# Patient Record
Sex: FEMALE | Race: OTHER | HISPANIC OR LATINO
[De-identification: names, ages, dates, MRNs, and addresses within clinical notes are randomized per-mention and may not be internally consistent; named-entity substitution may affect disease eponyms.]

---

## 2021-09-10 PROBLEM — Z00.00 ENCOUNTER FOR PREVENTIVE HEALTH EXAMINATION: Status: ACTIVE | Noted: 2021-09-10

## 2021-09-16 ENCOUNTER — APPOINTMENT (OUTPATIENT)
Dept: FAMILY MEDICINE | Facility: HOSPITAL | Age: 21
End: 2021-09-16

## 2021-09-16 ENCOUNTER — RESULT CHARGE (OUTPATIENT)
Age: 21
End: 2021-09-16

## 2021-09-16 ENCOUNTER — MED ADMIN CHARGE (OUTPATIENT)
Age: 21
End: 2021-09-16

## 2021-09-16 ENCOUNTER — OUTPATIENT (OUTPATIENT)
Dept: OUTPATIENT SERVICES | Facility: HOSPITAL | Age: 21
LOS: 1 days | End: 2021-09-16
Payer: SELF-PAY

## 2021-09-16 VITALS
SYSTOLIC BLOOD PRESSURE: 111 MMHG | DIASTOLIC BLOOD PRESSURE: 74 MMHG | OXYGEN SATURATION: 99 % | RESPIRATION RATE: 14 BRPM | WEIGHT: 102 LBS | HEART RATE: 62 BPM | TEMPERATURE: 97.1 F

## 2021-09-16 VITALS — BODY MASS INDEX: 18.07 KG/M2 | HEIGHT: 63 IN

## 2021-09-16 DIAGNOSIS — Z00.00 ENCOUNTER FOR GENERAL ADULT MEDICAL EXAMINATION WITHOUT ABNORMAL FINDINGS: ICD-10-CM

## 2021-09-16 DIAGNOSIS — R10.2 PELVIC AND PERINEAL PAIN: ICD-10-CM

## 2021-09-16 DIAGNOSIS — Z23 ENCOUNTER FOR IMMUNIZATION: ICD-10-CM

## 2021-09-16 DIAGNOSIS — Z11.3 ENCOUNTER FOR SCREENING FOR INFECTIONS WITH A PREDOMINANTLY SEXUAL MODE OF TRANSMISSION: ICD-10-CM

## 2021-09-16 LAB
BILIRUB UR QL STRIP: NORMAL
CLARITY UR: CLEAR
COLLECTION METHOD: NORMAL
GLUCOSE UR-MCNC: NORMAL
HCG UR QL: 0.2 EU/DL
HGB UR QL STRIP.AUTO: NORMAL
KETONES UR-MCNC: NORMAL
LEUKOCYTE ESTERASE UR QL STRIP: NORMAL
NITRITE UR QL STRIP: NORMAL
PH UR STRIP: 6
PROT UR STRIP-MCNC: NORMAL
SP GR UR STRIP: 1.02

## 2021-09-16 PROCEDURE — G0463: CPT

## 2021-09-16 PROCEDURE — 87800 DETECT AGNT MULT DNA DIREC: CPT

## 2021-09-16 PROCEDURE — 36415 COLL VENOUS BLD VENIPUNCTURE: CPT

## 2021-09-16 PROCEDURE — 87086 URINE CULTURE/COLONY COUNT: CPT

## 2021-09-16 PROCEDURE — 86780 TREPONEMA PALLIDUM: CPT

## 2021-09-16 PROCEDURE — 87389 HIV-1 AG W/HIV-1&-2 AB AG IA: CPT

## 2021-09-16 PROCEDURE — 81002 URINALYSIS NONAUTO W/O SCOPE: CPT

## 2021-09-17 LAB
C TRACH RRNA SPEC QL NAA+PROBE: NOT DETECTED
HIV1+2 AB SPEC QL IA.RAPID: NONREACTIVE
N GONORRHOEA RRNA SPEC QL NAA+PROBE: NOT DETECTED
SOURCE AMPLIFICATION: NORMAL
T PALLIDUM AB SER QL IA: NEGATIVE

## 2021-09-18 DIAGNOSIS — N76.0 ACUTE VAGINITIS: ICD-10-CM

## 2021-09-18 DIAGNOSIS — B96.89 ACUTE VAGINITIS: ICD-10-CM

## 2021-09-18 RX ORDER — METRONIDAZOLE 500 MG/1
500 TABLET ORAL TWICE DAILY
Qty: 14 | Refills: 0 | Status: ACTIVE | COMMUNITY
Start: 2021-09-18 | End: 1900-01-01

## 2021-09-19 DIAGNOSIS — R10.2 PELVIC AND PERINEAL PAIN: ICD-10-CM

## 2021-09-19 DIAGNOSIS — Z11.3 ENCOUNTER FOR SCREENING FOR INFECTIONS WITH A PREDOMINANTLY SEXUAL MODE OF TRANSMISSION: ICD-10-CM

## 2021-09-19 LAB
BACTERIA UR CULT: NORMAL
CANDIDA VAG CYTO: NOT DETECTED
G VAGINALIS+PREV SP MTYP VAG QL MICRO: DETECTED
T VAGINALIS VAG QL WET PREP: NOT DETECTED

## 2021-09-19 NOTE — REVIEW OF SYSTEMS
[Abdominal Pain] : abdominal pain [Dysuria] : dysuria [Vaginal Discharge] : vaginal discharge [Back Pain] : back pain [Negative] : Heme/Lymph [Joint Pain] : no joint pain [Joint Stiffness] : no joint stiffness [Joint Swelling] : no joint swelling [Muscle Weakness] : no muscle weakness [Muscle Pain] : no muscle pain

## 2021-09-19 NOTE — HISTORY OF PRESENT ILLNESS
[FreeTextEntry8] : 21 Y old female no PMH here for episodic suprapubic and lower back pain for past 3 months. The pain is non radiating and is intermittent throughout the day. Patient endorses dysuria and white vaginal discharge but denies increased urinary frequency or urgency. Patient denies fevers, chills, SOB, and chest pain. Patient is currently not sexually active and has 2 Nexplanon implants in left arm that was placed in home country of [place]. LMP was Jan 2020. Patient is not on any medications currently and recently [place] the [place] from [place]. \par

## 2021-09-19 NOTE — ASSESSMENT
[FreeTextEntry1] : # Suprapubic pain/ STD screening\par -Pain is non radiating associated with dysuria and white vaginal discharge\par - no fevers, vital signs stable.  \par - POCT urinalysis is negative, culture sent  \par - BV and GC ordered  \par - STI screening, syphilis, HIV ordered  \par \par #Nexplanon\par - patient has 2 nexplanons on left arm. patient states that is how it is done in [facility]. \par -Possible that 1st could not be removed so second one was placed without removal \par - Patient can return to clinic when Dr. Gerard ( OB-GYN) is available for possible removal. \par \par RTC in 1-2 weeks for CPE / nexplanon removal\par \par d/w Dr. Mcguire\par

## 2021-09-19 NOTE — PHYSICAL EXAM
[No Acute Distress] : no acute distress [Well Nourished] : well nourished [Well Developed] : well developed [Well-Appearing] : well-appearing [Normal Sclera/Conjunctiva] : normal sclera/conjunctiva [PERRL] : pupils equal round and reactive to light [EOMI] : extraocular movements intact [Normal Outer Ear/Nose] : the outer ears and nose were normal in appearance [Normal Oropharynx] : the oropharynx was normal [No JVD] : no jugular venous distention [No Lymphadenopathy] : no lymphadenopathy [Supple] : supple [Thyroid Normal, No Nodules] : the thyroid was normal and there were no nodules present [No Respiratory Distress] : no respiratory distress  [No Accessory Muscle Use] : no accessory muscle use [Clear to Auscultation] : lungs were clear to auscultation bilaterally [Normal Rate] : normal rate  [Regular Rhythm] : with a regular rhythm [Normal S1, S2] : normal S1 and S2 [No Murmur] : no murmur heard [No Carotid Bruits] : no carotid bruits [No Abdominal Bruit] : a ~M bruit was not heard ~T in the abdomen [No Varicosities] : no varicosities [Pedal Pulses Present] : the pedal pulses are present [No Edema] : there was no peripheral edema [No Palpable Aorta] : no palpable aorta [No Extremity Clubbing/Cyanosis] : no extremity clubbing/cyanosis [Soft] : abdomen soft [Non-distended] : non-distended [No Masses] : no abdominal mass palpated [No HSM] : no HSM [Normal Bowel Sounds] : normal bowel sounds [Normal Posterior Cervical Nodes] : no posterior cervical lymphadenopathy [Normal Anterior Cervical Nodes] : no anterior cervical lymphadenopathy [No CVA Tenderness] : no CVA  tenderness [No Spinal Tenderness] : no spinal tenderness [No Joint Swelling] : no joint swelling [Grossly Normal Strength/Tone] : grossly normal strength/tone [No Rash] : no rash [Coordination Grossly Intact] : coordination grossly intact [No Focal Deficits] : no focal deficits [Normal Gait] : normal gait [Deep Tendon Reflexes (DTR)] : deep tendon reflexes were 2+ and symmetric [Normal Affect] : the affect was normal [Normal Insight/Judgement] : insight and judgment were intact [de-identified] : Suprapubic tenderness

## 2021-10-01 ENCOUNTER — APPOINTMENT (OUTPATIENT)
Dept: FAMILY MEDICINE | Facility: HOSPITAL | Age: 21
End: 2021-10-01

## 2022-08-28 ENCOUNTER — INPATIENT (INPATIENT)
Facility: HOSPITAL | Age: 22
LOS: 4 days | Discharge: HOME | End: 2022-09-02
Attending: HOSPITALIST | Admitting: HOSPITALIST

## 2022-08-28 VITALS
TEMPERATURE: 102 F | HEART RATE: 111 BPM | RESPIRATION RATE: 18 BRPM | DIASTOLIC BLOOD PRESSURE: 56 MMHG | SYSTOLIC BLOOD PRESSURE: 107 MMHG | OXYGEN SATURATION: 99 %

## 2022-08-28 LAB
ALBUMIN SERPL ELPH-MCNC: 4.2 G/DL — SIGNIFICANT CHANGE UP (ref 3.3–5)
ALP SERPL-CCNC: 130 U/L — HIGH (ref 40–120)
ALT FLD-CCNC: 92 U/L — HIGH (ref 4–33)
ANION GAP SERPL CALC-SCNC: 13 MMOL/L — SIGNIFICANT CHANGE UP (ref 7–14)
APPEARANCE UR: ABNORMAL
APTT BLD: 30.6 SEC — SIGNIFICANT CHANGE UP (ref 27–36.3)
AST SERPL-CCNC: 40 U/L — HIGH (ref 4–32)
B PERT DNA SPEC QL NAA+PROBE: SIGNIFICANT CHANGE UP
B PERT+PARAPERT DNA PNL SPEC NAA+PROBE: SIGNIFICANT CHANGE UP
BACTERIA # UR AUTO: ABNORMAL
BASE EXCESS BLDV CALC-SCNC: 1.3 MMOL/L — SIGNIFICANT CHANGE UP (ref -2–3)
BASOPHILS # BLD AUTO: 0 K/UL — SIGNIFICANT CHANGE UP (ref 0–0.2)
BASOPHILS NFR BLD AUTO: 0 % — SIGNIFICANT CHANGE UP (ref 0–2)
BILIRUB SERPL-MCNC: 1.1 MG/DL — SIGNIFICANT CHANGE UP (ref 0.2–1.2)
BILIRUB UR-MCNC: NEGATIVE — SIGNIFICANT CHANGE UP
BLOOD GAS VENOUS COMPREHENSIVE RESULT: SIGNIFICANT CHANGE UP
BORDETELLA PARAPERTUSSIS (RAPRVP): SIGNIFICANT CHANGE UP
BUN SERPL-MCNC: 12 MG/DL — SIGNIFICANT CHANGE UP (ref 7–23)
C PNEUM DNA SPEC QL NAA+PROBE: SIGNIFICANT CHANGE UP
CALCIUM SERPL-MCNC: 9.5 MG/DL — SIGNIFICANT CHANGE UP (ref 8.4–10.5)
CHLORIDE BLDV-SCNC: 100 MMOL/L — SIGNIFICANT CHANGE UP (ref 96–108)
CHLORIDE SERPL-SCNC: 101 MMOL/L — SIGNIFICANT CHANGE UP (ref 98–107)
CO2 BLDV-SCNC: 28 MMOL/L — HIGH (ref 22–26)
CO2 SERPL-SCNC: 24 MMOL/L — SIGNIFICANT CHANGE UP (ref 22–31)
COLOR SPEC: YELLOW — SIGNIFICANT CHANGE UP
CREAT SERPL-MCNC: 0.71 MG/DL — SIGNIFICANT CHANGE UP (ref 0.5–1.3)
D DIMER BLD IA.RAPID-MCNC: 683 NG/ML DDU — HIGH
DIFF PNL FLD: ABNORMAL
EGFR: 124 ML/MIN/1.73M2 — SIGNIFICANT CHANGE UP
EOSINOPHIL # BLD AUTO: 0 K/UL — SIGNIFICANT CHANGE UP (ref 0–0.5)
EOSINOPHIL NFR BLD AUTO: 0 % — SIGNIFICANT CHANGE UP (ref 0–6)
EPI CELLS # UR: 4 /HPF — SIGNIFICANT CHANGE UP (ref 0–5)
FLUAV SUBTYP SPEC NAA+PROBE: SIGNIFICANT CHANGE UP
FLUBV RNA SPEC QL NAA+PROBE: SIGNIFICANT CHANGE UP
GAS PNL BLDV: 134 MMOL/L — LOW (ref 136–145)
GIANT PLATELETS BLD QL SMEAR: PRESENT — SIGNIFICANT CHANGE UP
GLUCOSE BLDV-MCNC: 128 MG/DL — HIGH (ref 70–99)
GLUCOSE SERPL-MCNC: 129 MG/DL — HIGH (ref 70–99)
GLUCOSE UR QL: NEGATIVE — SIGNIFICANT CHANGE UP
HADV DNA SPEC QL NAA+PROBE: SIGNIFICANT CHANGE UP
HCO3 BLDV-SCNC: 27 MMOL/L — SIGNIFICANT CHANGE UP (ref 22–29)
HCOV 229E RNA SPEC QL NAA+PROBE: SIGNIFICANT CHANGE UP
HCOV HKU1 RNA SPEC QL NAA+PROBE: SIGNIFICANT CHANGE UP
HCOV NL63 RNA SPEC QL NAA+PROBE: SIGNIFICANT CHANGE UP
HCOV OC43 RNA SPEC QL NAA+PROBE: SIGNIFICANT CHANGE UP
HCT VFR BLD CALC: 34.4 % — LOW (ref 34.5–45)
HCT VFR BLDA CALC: 35 % — SIGNIFICANT CHANGE UP (ref 34.5–46.5)
HGB BLD CALC-MCNC: 11.5 G/DL — SIGNIFICANT CHANGE UP (ref 11.5–15.5)
HGB BLD-MCNC: 11.3 G/DL — LOW (ref 11.5–15.5)
HMPV RNA SPEC QL NAA+PROBE: SIGNIFICANT CHANGE UP
HPIV1 RNA SPEC QL NAA+PROBE: SIGNIFICANT CHANGE UP
HPIV2 RNA SPEC QL NAA+PROBE: SIGNIFICANT CHANGE UP
HPIV3 RNA SPEC QL NAA+PROBE: SIGNIFICANT CHANGE UP
HPIV4 RNA SPEC QL NAA+PROBE: SIGNIFICANT CHANGE UP
HYALINE CASTS # UR AUTO: 0 /LPF — SIGNIFICANT CHANGE UP (ref 0–7)
IANC: 9.99 K/UL — HIGH (ref 1.8–7.4)
INR BLD: 1.44 RATIO — HIGH (ref 0.88–1.16)
KETONES UR-MCNC: ABNORMAL
LACTATE BLDV-MCNC: 1.5 MMOL/L — SIGNIFICANT CHANGE UP (ref 0.5–2)
LEUKOCYTE ESTERASE UR-ACNC: ABNORMAL
LIDOCAIN IGE QN: 41 U/L — SIGNIFICANT CHANGE UP (ref 7–60)
LYMPHOCYTES # BLD AUTO: 0.43 K/UL — LOW (ref 1–3.3)
LYMPHOCYTES # BLD AUTO: 3.5 % — LOW (ref 13–44)
M PNEUMO DNA SPEC QL NAA+PROBE: SIGNIFICANT CHANGE UP
MCHC RBC-ENTMCNC: 27.8 PG — SIGNIFICANT CHANGE UP (ref 27–34)
MCHC RBC-ENTMCNC: 32.8 GM/DL — SIGNIFICANT CHANGE UP (ref 32–36)
MCV RBC AUTO: 84.5 FL — SIGNIFICANT CHANGE UP (ref 80–100)
MONOCYTES # BLD AUTO: 0.87 K/UL — SIGNIFICANT CHANGE UP (ref 0–0.9)
MONOCYTES NFR BLD AUTO: 7.1 % — SIGNIFICANT CHANGE UP (ref 2–14)
NEUTROPHILS # BLD AUTO: 10.93 K/UL — HIGH (ref 1.8–7.4)
NEUTROPHILS NFR BLD AUTO: 77.9 % — HIGH (ref 43–77)
NEUTS BAND # BLD: 11.5 % — CRITICAL HIGH (ref 0–6)
NITRITE UR-MCNC: POSITIVE
PCO2 BLDV: 44 MMHG — HIGH (ref 39–42)
PH BLDV: 7.39 — SIGNIFICANT CHANGE UP (ref 7.32–7.43)
PH UR: 6.5 — SIGNIFICANT CHANGE UP (ref 5–8)
PLAT MORPH BLD: NORMAL — SIGNIFICANT CHANGE UP
PLATELET # BLD AUTO: 160 K/UL — SIGNIFICANT CHANGE UP (ref 150–400)
PLATELET COUNT - ESTIMATE: NORMAL — SIGNIFICANT CHANGE UP
PO2 BLDV: 25 MMHG — SIGNIFICANT CHANGE UP
POTASSIUM BLDV-SCNC: 3.8 MMOL/L — SIGNIFICANT CHANGE UP (ref 3.5–5.1)
POTASSIUM SERPL-MCNC: 4 MMOL/L — SIGNIFICANT CHANGE UP (ref 3.5–5.3)
POTASSIUM SERPL-SCNC: 4 MMOL/L — SIGNIFICANT CHANGE UP (ref 3.5–5.3)
PROT SERPL-MCNC: 8.1 G/DL — SIGNIFICANT CHANGE UP (ref 6–8.3)
PROT UR-MCNC: ABNORMAL
PROTHROM AB SERPL-ACNC: 16.8 SEC — HIGH (ref 10.5–13.4)
RAPID RVP RESULT: DETECTED
RBC # BLD: 4.07 M/UL — SIGNIFICANT CHANGE UP (ref 3.8–5.2)
RBC # FLD: 13.5 % — SIGNIFICANT CHANGE UP (ref 10.3–14.5)
RBC BLD AUTO: NORMAL — SIGNIFICANT CHANGE UP
RBC CASTS # UR COMP ASSIST: 3 /HPF — SIGNIFICANT CHANGE UP (ref 0–4)
RSV RNA SPEC QL NAA+PROBE: SIGNIFICANT CHANGE UP
RV+EV RNA SPEC QL NAA+PROBE: DETECTED
SAO2 % BLDV: 40.2 % — SIGNIFICANT CHANGE UP
SARS-COV-2 RNA SPEC QL NAA+PROBE: SIGNIFICANT CHANGE UP
SODIUM SERPL-SCNC: 138 MMOL/L — SIGNIFICANT CHANGE UP (ref 135–145)
SP GR SPEC: 1.02 — SIGNIFICANT CHANGE UP (ref 1.01–1.05)
UROBILINOGEN FLD QL: SIGNIFICANT CHANGE UP
WBC # BLD: 12.23 K/UL — HIGH (ref 3.8–10.5)
WBC # FLD AUTO: 12.23 K/UL — HIGH (ref 3.8–10.5)
WBC UR QL: 344 /HPF — HIGH (ref 0–5)

## 2022-08-28 PROCEDURE — 71045 X-RAY EXAM CHEST 1 VIEW: CPT | Mod: 26

## 2022-08-28 PROCEDURE — 99285 EMERGENCY DEPT VISIT HI MDM: CPT

## 2022-08-28 RX ORDER — ACETAMINOPHEN 500 MG
650 TABLET ORAL ONCE
Refills: 0 | Status: COMPLETED | OUTPATIENT
Start: 2022-08-28 | End: 2022-08-28

## 2022-08-28 RX ORDER — SODIUM CHLORIDE 9 MG/ML
2000 INJECTION INTRAMUSCULAR; INTRAVENOUS; SUBCUTANEOUS ONCE
Refills: 0 | Status: COMPLETED | OUTPATIENT
Start: 2022-08-28 | End: 2022-08-28

## 2022-08-28 RX ORDER — SODIUM CHLORIDE 9 MG/ML
1000 INJECTION INTRAMUSCULAR; INTRAVENOUS; SUBCUTANEOUS ONCE
Refills: 0 | Status: COMPLETED | OUTPATIENT
Start: 2022-08-28 | End: 2022-08-28

## 2022-08-28 RX ORDER — KETOROLAC TROMETHAMINE 30 MG/ML
15 SYRINGE (ML) INJECTION ONCE
Refills: 0 | Status: DISCONTINUED | OUTPATIENT
Start: 2022-08-28 | End: 2022-08-28

## 2022-08-28 RX ORDER — CEFTRIAXONE 500 MG/1
1000 INJECTION, POWDER, FOR SOLUTION INTRAMUSCULAR; INTRAVENOUS ONCE
Refills: 0 | Status: COMPLETED | OUTPATIENT
Start: 2022-08-28 | End: 2022-08-28

## 2022-08-28 RX ORDER — ONDANSETRON 8 MG/1
4 TABLET, FILM COATED ORAL ONCE
Refills: 0 | Status: COMPLETED | OUTPATIENT
Start: 2022-08-28 | End: 2022-08-28

## 2022-08-28 RX ADMIN — Medication 15 MILLIGRAM(S): at 21:49

## 2022-08-28 RX ADMIN — Medication 650 MILLIGRAM(S): at 21:55

## 2022-08-28 RX ADMIN — Medication 650 MILLIGRAM(S): at 19:08

## 2022-08-28 RX ADMIN — SODIUM CHLORIDE 1000 MILLILITER(S): 9 INJECTION INTRAMUSCULAR; INTRAVENOUS; SUBCUTANEOUS at 21:49

## 2022-08-28 RX ADMIN — SODIUM CHLORIDE 2000 MILLILITER(S): 9 INJECTION INTRAMUSCULAR; INTRAVENOUS; SUBCUTANEOUS at 19:05

## 2022-08-28 RX ADMIN — CEFTRIAXONE 100 MILLIGRAM(S): 500 INJECTION, POWDER, FOR SOLUTION INTRAMUSCULAR; INTRAVENOUS at 21:48

## 2022-08-28 RX ADMIN — ONDANSETRON 4 MILLIGRAM(S): 8 TABLET, FILM COATED ORAL at 19:08

## 2022-08-28 NOTE — ED PROVIDER NOTE - PROGRESS NOTE DETAILS
ordered ct angio chest for elevated dimer. pt aware +UTI, order IV abx. SBP 93, pt not dizzy or lightheaded. order 3rd IVF, pending cta pt stable, sleeping. ct cw pyelonephritis no pe. will admit. no cdu beds SBP 93, pt not dizzy or lightheaded. order 3rd IVF, pending cta  aware will need admission Admit to Dr Rogers   temp 103 will give IVF and tylenol

## 2022-08-28 NOTE — ED PROVIDER NOTE - OBJECTIVE STATEMENT
Dr Flores  22yoF denies any sig pmhx pw fever, abdominal pain, nausea and vomiting x 3 days. Endorse temp 101.3 w chills at home, +diffuse body ache, decrease po intake, associated with nausea, NB vomit, lose nonbloody BM. no travel no sick contact no cough. no chest pain or sob, states "when deep breath it hurts" on both sides of upper abdomen. no ROCHE . no hx of PE. Vaccinated for covid. Denies any rash Denies urinary complaints, Denies preg  DW pt in Zimbabwean and english Dr Flores  22yoF denies any sig pmhx pw fever, abdominal pain, nausea and vomiting x 3 days. Endorse temp 101.3 w chills at home, +diffuse body ache, decrease po intake, associated with nausea, NB vomit, lose nonbloody BM. no travel no sick contact no cough. no chest pain or sob, states "when deep breath it hurts" on both sides of upper abdomen. no ROCHE . no hx of PE. Vaccinated for covid. Denies any rash Denies urinary complaints, Denies preg  DW pt in Emir[place] and english Dr Flores  22yoF denies any sig pmhx pw fever, abdominal pain, nausea and vomiting x 3 days. Endorse temp 101.3 w chills at home, +diffuse body ache, decrease po intake, associated with nausea, NB vomit, lose nonbloody BM. no travel no sick contact no cough. no chest pain or sob, states "when deep breath it hurts" on both sides of upper abdomen. no ROCHE . no hx of PE. Vaccinated for covid. Denies any rash Denies urinary complaints, Denies preg  DW pt in Italian and english

## 2022-08-28 NOTE — ED PROVIDER NOTE - PHYSICAL EXAMINATION
Dr Flores  nontoxic female, no photophobia supple neck. non icteric no juandice   regular tachycardic  No resp distress. able to speak in full and clear sentences. no wheeze, rales or stridor.  thin female nondistended mild tenderness of the upper abdomen lateral aspect bl, no rash, no ecchymosis no cvat   nontender lower abdomen  no pedal edema. no calf tenderness. normal pulses bilateral feet.

## 2022-08-28 NOTE — ED ADULT NURSE REASSESSMENT NOTE - NS ED NURSE REASSESS COMMENT FT1
pt alert,oriented x3. reports " feels a little better" meds as ordered given. md aware v/s. family member at bedside.

## 2022-08-28 NOTE — ED PROVIDER NOTE - MDM ORDERS SUBMITTED SELECTION
Labs/Imaging Studies/Medications Full Thickness Lip Wedge Repair (Flap) Text: Given the location of the defect and the proximity to free margins a full thickness wedge repair was deemed most appropriate.  Using a sterile surgical marker, the appropriate repair was drawn incorporating the defect and placing the expected incisions perpendicular to the vermilion border.  The vermilion border was also meticulously outlined to ensure appropriate reapproximation during the repair.  The area thus outlined was incised through and through with a #15 scalpel blade.  The muscularis and dermis were reaproximated with deep sutures following hemostasis. Care was taken to realign the vermilion border before proceeding with the superficial closure.  Once the vermilion was realigned the superfical and mucosal closure was finished.

## 2022-08-28 NOTE — ED ADULT NURSE NOTE - OBJECTIVE STATEMENT
c/o fever, SOB and midsternal c/p onset 2 days ago worse today, abd soft non distended non tender, breathing is even and unlabored, dry non productive cough noted, lung sounds clear B/l orders noted and completed.

## 2022-08-29 ENCOUNTER — TRANSCRIPTION ENCOUNTER (OUTPATIENT)
Age: 22
End: 2022-08-29

## 2022-08-29 ENCOUNTER — NON-APPOINTMENT (OUTPATIENT)
Age: 22
End: 2022-08-29

## 2022-08-29 DIAGNOSIS — R79.89 OTHER SPECIFIED ABNORMAL FINDINGS OF BLOOD CHEMISTRY: ICD-10-CM

## 2022-08-29 DIAGNOSIS — N12 TUBULO-INTERSTITIAL NEPHRITIS, NOT SPECIFIED AS ACUTE OR CHRONIC: ICD-10-CM

## 2022-08-29 DIAGNOSIS — Z29.9 ENCOUNTER FOR PROPHYLACTIC MEASURES, UNSPECIFIED: ICD-10-CM

## 2022-08-29 DIAGNOSIS — B34.1 ENTEROVIRUS INFECTION, UNSPECIFIED: ICD-10-CM

## 2022-08-29 DIAGNOSIS — A41.9 SEPSIS, UNSPECIFIED ORGANISM: ICD-10-CM

## 2022-08-29 DIAGNOSIS — R16.1 SPLENOMEGALY, NOT ELSEWHERE CLASSIFIED: ICD-10-CM

## 2022-08-29 LAB
ALBUMIN SERPL ELPH-MCNC: 3.3 G/DL — SIGNIFICANT CHANGE UP (ref 3.3–5)
ALP SERPL-CCNC: 124 U/L — HIGH (ref 40–120)
ALT FLD-CCNC: 128 U/L — HIGH (ref 4–33)
ANION GAP SERPL CALC-SCNC: 12 MMOL/L — SIGNIFICANT CHANGE UP (ref 7–14)
AST SERPL-CCNC: 82 U/L — HIGH (ref 4–32)
BASOPHILS # BLD AUTO: 0.01 K/UL — SIGNIFICANT CHANGE UP (ref 0–0.2)
BASOPHILS NFR BLD AUTO: 0.1 % — SIGNIFICANT CHANGE UP (ref 0–2)
BILIRUB SERPL-MCNC: 1 MG/DL — SIGNIFICANT CHANGE UP (ref 0.2–1.2)
BUN SERPL-MCNC: 9 MG/DL — SIGNIFICANT CHANGE UP (ref 7–23)
CALCIUM SERPL-MCNC: 8.1 MG/DL — LOW (ref 8.4–10.5)
CHLORIDE SERPL-SCNC: 107 MMOL/L — SIGNIFICANT CHANGE UP (ref 98–107)
CO2 SERPL-SCNC: 18 MMOL/L — LOW (ref 22–31)
CREAT SERPL-MCNC: 0.77 MG/DL — SIGNIFICANT CHANGE UP (ref 0.5–1.3)
EGFR: 112 ML/MIN/1.73M2 — SIGNIFICANT CHANGE UP
EOSINOPHIL # BLD AUTO: 0 K/UL — SIGNIFICANT CHANGE UP (ref 0–0.5)
EOSINOPHIL NFR BLD AUTO: 0 % — SIGNIFICANT CHANGE UP (ref 0–6)
GLUCOSE BLDC GLUCOMTR-MCNC: 93 MG/DL — SIGNIFICANT CHANGE UP (ref 70–99)
GLUCOSE SERPL-MCNC: 115 MG/DL — HIGH (ref 70–99)
HAV IGM SER-ACNC: SIGNIFICANT CHANGE UP
HBV CORE IGM SER-ACNC: SIGNIFICANT CHANGE UP
HBV SURFACE AG SER-ACNC: SIGNIFICANT CHANGE UP
HCT VFR BLD CALC: 31.7 % — LOW (ref 34.5–45)
HCV AB S/CO SERPL IA: 0.12 S/CO — SIGNIFICANT CHANGE UP (ref 0–0.99)
HCV AB SERPL-IMP: SIGNIFICANT CHANGE UP
HGB BLD-MCNC: 10.5 G/DL — LOW (ref 11.5–15.5)
HIV 1+2 AB+HIV1 P24 AG SERPL QL IA: SIGNIFICANT CHANGE UP
IANC: 7.54 K/UL — HIGH (ref 1.8–7.4)
IMM GRANULOCYTES NFR BLD AUTO: 1 % — SIGNIFICANT CHANGE UP (ref 0–1.5)
LYMPHOCYTES # BLD AUTO: 1.01 K/UL — SIGNIFICANT CHANGE UP (ref 1–3.3)
LYMPHOCYTES # BLD AUTO: 10.8 % — LOW (ref 13–44)
MAGNESIUM SERPL-MCNC: 1.9 MG/DL — SIGNIFICANT CHANGE UP (ref 1.6–2.6)
MCHC RBC-ENTMCNC: 28.8 PG — SIGNIFICANT CHANGE UP (ref 27–34)
MCHC RBC-ENTMCNC: 33.1 GM/DL — SIGNIFICANT CHANGE UP (ref 32–36)
MCV RBC AUTO: 86.8 FL — SIGNIFICANT CHANGE UP (ref 80–100)
MONOCYTES # BLD AUTO: 0.66 K/UL — SIGNIFICANT CHANGE UP (ref 0–0.9)
MONOCYTES NFR BLD AUTO: 7.1 % — SIGNIFICANT CHANGE UP (ref 2–14)
NEUTROPHILS # BLD AUTO: 7.54 K/UL — HIGH (ref 1.8–7.4)
NEUTROPHILS NFR BLD AUTO: 81 % — HIGH (ref 43–77)
NRBC # BLD: 0 /100 WBCS — SIGNIFICANT CHANGE UP (ref 0–0)
NRBC # FLD: 0 K/UL — SIGNIFICANT CHANGE UP (ref 0–0)
PHOSPHATE SERPL-MCNC: 2.4 MG/DL — LOW (ref 2.5–4.5)
PLATELET # BLD AUTO: 129 K/UL — LOW (ref 150–400)
POTASSIUM SERPL-MCNC: 3.7 MMOL/L — SIGNIFICANT CHANGE UP (ref 3.5–5.3)
POTASSIUM SERPL-SCNC: 3.7 MMOL/L — SIGNIFICANT CHANGE UP (ref 3.5–5.3)
PROT SERPL-MCNC: 6.7 G/DL — SIGNIFICANT CHANGE UP (ref 6–8.3)
RBC # BLD: 3.65 M/UL — LOW (ref 3.8–5.2)
RBC # FLD: 13.9 % — SIGNIFICANT CHANGE UP (ref 10.3–14.5)
SODIUM SERPL-SCNC: 137 MMOL/L — SIGNIFICANT CHANGE UP (ref 135–145)
WBC # BLD: 9.31 K/UL — SIGNIFICANT CHANGE UP (ref 3.8–10.5)
WBC # FLD AUTO: 9.31 K/UL — SIGNIFICANT CHANGE UP (ref 3.8–10.5)

## 2022-08-29 PROCEDURE — G1004: CPT

## 2022-08-29 PROCEDURE — 12345: CPT | Mod: NC

## 2022-08-29 PROCEDURE — 74177 CT ABD & PELVIS W/CONTRAST: CPT | Mod: 26,MG

## 2022-08-29 PROCEDURE — 93010 ELECTROCARDIOGRAM REPORT: CPT

## 2022-08-29 PROCEDURE — 71275 CT ANGIOGRAPHY CHEST: CPT | Mod: 26,MG

## 2022-08-29 PROCEDURE — 99223 1ST HOSP IP/OBS HIGH 75: CPT

## 2022-08-29 RX ORDER — PIPERACILLIN AND TAZOBACTAM 4; .5 G/20ML; G/20ML
3.38 INJECTION, POWDER, LYOPHILIZED, FOR SOLUTION INTRAVENOUS ONCE
Refills: 0 | Status: COMPLETED | OUTPATIENT
Start: 2022-08-29 | End: 2022-08-29

## 2022-08-29 RX ORDER — OXYCODONE AND ACETAMINOPHEN 5; 325 MG/1; MG/1
1 TABLET ORAL EVERY 8 HOURS
Refills: 0 | Status: DISCONTINUED | OUTPATIENT
Start: 2022-08-29 | End: 2022-09-01

## 2022-08-29 RX ORDER — SODIUM CHLORIDE 9 MG/ML
1000 INJECTION INTRAMUSCULAR; INTRAVENOUS; SUBCUTANEOUS
Refills: 0 | Status: DISCONTINUED | OUTPATIENT
Start: 2022-08-29 | End: 2022-09-02

## 2022-08-29 RX ORDER — IBUPROFEN 200 MG
400 TABLET ORAL ONCE
Refills: 0 | Status: COMPLETED | OUTPATIENT
Start: 2022-08-29 | End: 2022-08-29

## 2022-08-29 RX ORDER — SODIUM CHLORIDE 9 MG/ML
3 INJECTION INTRAMUSCULAR; INTRAVENOUS; SUBCUTANEOUS EVERY 8 HOURS
Refills: 0 | Status: DISCONTINUED | OUTPATIENT
Start: 2022-08-29 | End: 2022-09-02

## 2022-08-29 RX ORDER — ACETAMINOPHEN 500 MG
650 TABLET ORAL EVERY 6 HOURS
Refills: 0 | Status: DISCONTINUED | OUTPATIENT
Start: 2022-08-29 | End: 2022-09-02

## 2022-08-29 RX ORDER — MEROPENEM 1 G/30ML
1000 INJECTION INTRAVENOUS EVERY 8 HOURS
Refills: 0 | Status: DISCONTINUED | OUTPATIENT
Start: 2022-08-29 | End: 2022-08-31

## 2022-08-29 RX ORDER — ACETAMINOPHEN 500 MG
1000 TABLET ORAL ONCE
Refills: 0 | Status: COMPLETED | OUTPATIENT
Start: 2022-08-29 | End: 2022-08-29

## 2022-08-29 RX ORDER — SODIUM,POTASSIUM PHOSPHATES 278-250MG
1 POWDER IN PACKET (EA) ORAL THREE TIMES A DAY
Refills: 0 | Status: COMPLETED | OUTPATIENT
Start: 2022-08-29 | End: 2022-08-30

## 2022-08-29 RX ORDER — ENOXAPARIN SODIUM 100 MG/ML
40 INJECTION SUBCUTANEOUS EVERY 24 HOURS
Refills: 0 | Status: DISCONTINUED | OUTPATIENT
Start: 2022-08-29 | End: 2022-09-02

## 2022-08-29 RX ORDER — ACETAMINOPHEN 500 MG
750 TABLET ORAL ONCE
Refills: 0 | Status: COMPLETED | OUTPATIENT
Start: 2022-08-29 | End: 2022-08-29

## 2022-08-29 RX ORDER — PIPERACILLIN AND TAZOBACTAM 4; .5 G/20ML; G/20ML
3.38 INJECTION, POWDER, LYOPHILIZED, FOR SOLUTION INTRAVENOUS EVERY 8 HOURS
Refills: 0 | Status: DISCONTINUED | OUTPATIENT
Start: 2022-08-29 | End: 2022-08-29

## 2022-08-29 RX ADMIN — ENOXAPARIN SODIUM 40 MILLIGRAM(S): 100 INJECTION SUBCUTANEOUS at 06:43

## 2022-08-29 RX ADMIN — Medication 1 TABLET(S): at 22:12

## 2022-08-29 RX ADMIN — PIPERACILLIN AND TAZOBACTAM 200 GRAM(S): 4; .5 INJECTION, POWDER, LYOPHILIZED, FOR SOLUTION INTRAVENOUS at 04:23

## 2022-08-29 RX ADMIN — SODIUM CHLORIDE 75 MILLILITER(S): 9 INJECTION INTRAMUSCULAR; INTRAVENOUS; SUBCUTANEOUS at 12:57

## 2022-08-29 RX ADMIN — SODIUM CHLORIDE 75 MILLILITER(S): 9 INJECTION INTRAMUSCULAR; INTRAVENOUS; SUBCUTANEOUS at 22:12

## 2022-08-29 RX ADMIN — SODIUM CHLORIDE 3 MILLILITER(S): 9 INJECTION INTRAMUSCULAR; INTRAVENOUS; SUBCUTANEOUS at 13:23

## 2022-08-29 RX ADMIN — MEROPENEM 100 MILLIGRAM(S): 1 INJECTION INTRAVENOUS at 19:34

## 2022-08-29 RX ADMIN — Medication 1 TABLET(S): at 13:38

## 2022-08-29 RX ADMIN — SODIUM CHLORIDE 75 MILLILITER(S): 9 INJECTION INTRAMUSCULAR; INTRAVENOUS; SUBCUTANEOUS at 02:31

## 2022-08-29 RX ADMIN — Medication 400 MILLIGRAM(S): at 02:30

## 2022-08-29 RX ADMIN — SODIUM CHLORIDE 3 MILLILITER(S): 9 INJECTION INTRAMUSCULAR; INTRAVENOUS; SUBCUTANEOUS at 22:01

## 2022-08-29 RX ADMIN — Medication 300 MILLIGRAM(S): at 18:20

## 2022-08-29 RX ADMIN — SODIUM CHLORIDE 3 MILLILITER(S): 9 INJECTION INTRAMUSCULAR; INTRAVENOUS; SUBCUTANEOUS at 06:07

## 2022-08-29 RX ADMIN — PIPERACILLIN AND TAZOBACTAM 25 GRAM(S): 4; .5 INJECTION, POWDER, LYOPHILIZED, FOR SOLUTION INTRAVENOUS at 12:27

## 2022-08-29 RX ADMIN — Medication 400 MILLIGRAM(S): at 06:44

## 2022-08-29 RX ADMIN — OXYCODONE AND ACETAMINOPHEN 1 TABLET(S): 5; 325 TABLET ORAL at 13:27

## 2022-08-29 RX ADMIN — OXYCODONE AND ACETAMINOPHEN 1 TABLET(S): 5; 325 TABLET ORAL at 12:57

## 2022-08-29 NOTE — H&P ADULT - CARDIOVASCULAR
details… S1 S2 present/no gallops/no rub/no murmur/tachycardia regular rate and rhythm/S1 S2 present/no gallops/no rub/no murmur/tachycardia

## 2022-08-29 NOTE — H&P ADULT - MUSCULOSKELETAL
normal/ROM intact/normal gait/strength 5/5 bilateral upper extremities/strength 5/5 bilateral lower extremities details… ROM intact/normal gait/strength 5/5 bilateral upper extremities/strength 5/5 bilateral lower extremities

## 2022-08-29 NOTE — PROGRESS NOTE ADULT - PROBLEM SELECTOR PLAN 5
CT showing mild splenomegaly, measuring 13 cm in CC dimension.  ?in setting of acute viral infections. Asymptomatic. Consider repeat imaging as outpatient for re-evaluation and further workup if remains enlarged

## 2022-08-29 NOTE — DISCHARGE NOTE PROVIDER - NSFOLLOWUPCLINICSTOKEN_GEN_ALL_ED_FT
889307:1 week|| ||00\01||False;021323:1-3 days|| ||00\01||False; 804873:1 week|| ||00\01||False;522763:1-3 days|| ||00\01||False; 348911:1 week|| ||00\01||False;903185:1-3 days|| ||00\01||False;

## 2022-08-29 NOTE — H&P ADULT - NSHPLABSRESULTS_GEN_ALL_CORE
11.3   12.23 )-----------( 160      ( 28 Aug 2022 19:30 )             34.4       138  |  101  |  12  ----------------------------<  129<H>  4.0   |  24  |  0.71    Ca    9.5      28 Aug 2022 19:30    TPro  8.1  /  Alb  4.2  /  TBili  1.1  /  DBili  x   /  AST  40<H>  /  ALT  92<H>  /  AlkPhos  130<H>      PT/INR - ( 28 Aug 2022 19:30 )   PT: 16.8 sec;   INR: 1.44 ratio       PTT - ( 28 Aug 2022 19:30 )  PTT:30.6 sec    D-dimer: 683  HCG: <5.0    19:30 - VBG - pH: 7.39  | pCO2: 44    | pO2: 25    | Lactate: 1.5      Urinalysis Basic - ( 28 Aug 2022 19:30 )    Color: Yellow / Appearance: Slightly Turbid / S.025 / pH: x  Gluc: x / Ketone: Moderate  / Bili: Negative / Urobili: <2 mg/dL   Blood: x / Protein: 300 mg/dL / Nitrite: Positive   Leuk Esterase: Large / RBC: 3 /HPF /  /HPF   Sq Epi: x / Non Sq Epi: 4 /HPF / Bacteria: Many    RVP positive for Enterovirus.    CTA  Chest PE protocol, CT abdomen and pelvis w/IV contrast:  FINDINGS:  CHEST:  LUNGS AND LARGE AIRWAYS: Patent central airways. No pulmonary nodules or   parenchymal consolidation.  PLEURA: No pleural effusion.  VESSELS: No main, lobar, segmental, or proximal subsegmental pulmonary   embolism. Evaluation of many distal subsegmental pulmonary arteries is   limited secondary to respiratory motion artifact and poor contrast   opacification.  HEART: Heart size is normal. No pericardial effusion.  MEDIASTINUM AND PRITI: No lymphadenopathy.  CHEST WALL AND LOWER NECK: Within normal limits.    ABDOMEN AND PELVIS:  LIVER: Mild periportal edema, nonspecific.  BILE DUCTS: Normal caliber.  GALLBLADDER: Within normal limits.  SPLEEN: Mild splenomegaly, measuring 13 cm in CC dimension..  PANCREAS: Within normal limits.  ADRENALS: Within normal limits.  KIDNEYS/URETERS: Delayed right nephrogram with multiple foci of   wedge-shaped hypoattenuation extending to the cortex. No hydronephrosis.   The left kidney is normal.    BLADDER: Minimally distended.  REPRODUCTIVE ORGANS: Uterus and adnexa within normal limits.    BOWEL: No bowel obstruction. Appendix is not visualized.  PERITONEUM: Small pelvic ascites.  VESSELS: Within normal limits.  RETROPERITONEUM/LYMPH NODES: Small volume right retroperitoneal free   fluid.  ABDOMINAL WALL: Within normal limits.  BONES: Within normal limits.    IMPRESSION:    Right pyelonephritis.    No pulmonary embolism.    CXR: Preliminary report:  FINDINGS:  The lungs are clear.  There is no pleural effusion or pneumothorax.  The heart size is normal.  The visualized osseous structures demonstrate no acute pathology.    IMPRESSION:  Clear lungs.    EKG .    -personally interpreted CXR: clear lungs, no pleural effusions;     -personally reviewed labs:    11.3   12.23 )-----------( 160      ( 28 Aug 2022 19:30 )             34.4       138  |  101  |  12  ----------------------------<  129<H>  4.0   |  24  |  0.71    Ca    9.5      28 Aug 2022 19:30    TPro  8.1  /  Alb  4.2  /  TBili  1.1  /  DBili  x   /  AST  40<H>  /  ALT  92<H>  /  AlkPhos  130<H>      PT/INR - ( 28 Aug 2022 19:30 )   PT: 16.8 sec;   INR: 1.44 ratio       PTT - ( 28 Aug 2022 19:30 )  PTT:30.6 sec    D-dimer: 683  HCG: <5.0    19:30 - VBG - pH: 7.39  | pCO2: 44    | pO2: 25    | Lactate: 1.5      Urinalysis Basic - ( 28 Aug 2022 19:30 )    Color: Yellow / Appearance: Slightly Turbid / S.025 / pH: x  Gluc: x / Ketone: Moderate  / Bili: Negative / Urobili: <2 mg/dL   Blood: x / Protein: 300 mg/dL / Nitrite: Positive   Leuk Esterase: Large / RBC: 3 /HPF /  /HPF   Sq Epi: x / Non Sq Epi: 4 /HPF / Bacteria: Many  RVP positive for Enterovirus.    -personally reviewed: CT ABDOMEN AND PELVIS IC[facility]      CHEST:  LUNGS AND LARGE AIRWAYS: Patent central airways. No pulmonary nodules or   parenchymal consolidation.  PLEURA: No pleural effusion.  VESSELS: No main, lobar, segmental, or proximal subsegmental pulmonary   embolism. Evaluation of many distal subsegmental pulmonary arteries is   limited secondary to respiratory motion artifact and poor contrast   opacification.  HEART: Heart size is normal. No pericardial effusion.  MEDIASTINUM AND PRITI: No lymphadenopathy.  CHEST WALL AND LOWER NECK: Within normal limits.  ABDOMEN AND PELVIS:  LIVER: Mild periportal edema, nonspecific.  BILE DUCTS: Normal caliber.  GALLBLADDER: Within normal limits.  SPLEEN: Mild splenomegaly, measuring 13 cm in CC dimension..  PANCREAS: Within normal limits.  ADRENALS: Within normal limits.  KIDNEYS/URETERS: Delayed right nephrogram with multiple foci of   wedge-shaped hypoattenuation extending to the cortex. No hydronephrosis.   The left kidney is normal.  BLADDER: Minimally distended.  REPRODUCTIVE ORGANS: Uterus and adnexa within normal limits.  BOWEL: No bowel obstruction. Appendix is not visualized.  PERITONEUM: Small pelvic ascites.  VESSELS: Within normal limits.  RETROPERITONEUM/LYMPH NODES: Small volume right retroperitoneal free   fluid.  ABDOMINAL WALL: Within normal limits.  BONES: Within normal limits.    IMPRESSION:  Right pyelonephritis.  No pulmonary embolism. .    -personally interpreted CXR: clear lungs, no pleural effusions;     -personally reviewed labs:    11.3   12.23 )-----------( 160      ( 28 Aug 2022 19:30 )             34.4       138  |  101  |  12  ----------------------------<  129<H>  4.0   |  24  |  0.71    Ca    9.5      28 Aug 2022 19:30    TPro  8.1  /  Alb  4.2  /  TBili  1.1  /  DBili  x   /  AST  40<H>  /  ALT  92<H>  /  AlkPhos  130<H>      PT/INR - ( 28 Aug 2022 19:30 )   PT: 16.8 sec;   INR: 1.44 ratio       PTT - ( 28 Aug 2022 19:30 )  PTT:30.6 sec    D-dimer: 683  HCG: <5.0    19:30 - VBG - pH: 7.39  | pCO2: 44    | pO2: 25    | Lactate: 1.5      Urinalysis Basic - ( 28 Aug 2022 19:30 )    Color: Yellow / Appearance: Slightly Turbid / S.025 / pH: x  Gluc: x / Ketone: Moderate  / Bili: Negative / Urobili: <2 mg/dL   Blood: x / Protein: 300 mg/dL / Nitrite: Positive   Leuk Esterase: Large / RBC: 3 /HPF /  /HPF   Sq Epi: x / Non Sq Epi: 4 /HPF / Bacteria: Many  RVP positive for Enterovirus.    -personally reviewed: CT [facility]BDOMEN [facility]ELVIS IC[facility]      CHEST:  LUNGS AND LARGE AIRWAYS: Patent central airways. No pulmonary nodules or   parenchymal consolidation.  PLEURA: No pleural effusion.  VESSELS: No main, lobar, segmental, or proximal subsegmental pulmonary   embolism. Evaluation of many distal subsegmental pulmonary arteries is   limited secondary to respiratory motion artifact and poor contrast   opacification.  HEART: Heart size is normal. No pericardial effusion.  MEDIASTINUM AND PRITI: No lymphadenopathy.  CHEST WALL AND LOWER NECK: Within normal limits.  ABDOMEN AND PELVIS:  LIVER: Mild periportal edema, nonspecific.  BILE DUCTS: Normal caliber.  GALLBLADDER: Within normal limits.  SPLEEN: Mild splenomegaly, measuring 13 cm in CC dimension..  PANCREAS: Within normal limits.  ADRENALS: Within normal limits.  KIDNEYS/URETERS: Delayed right nephrogram with multiple foci of   wedge-shaped hypoattenuation extending to the cortex. No hydronephrosis.   The left kidney is normal.  BLADDER: Minimally distended.  REPRODUCTIVE ORGANS: Uterus and adnexa within normal limits.  BOWEL: No bowel obstruction. Appendix is not visualized.  PERITONEUM: Small pelvic ascites.  VESSELS: Within normal limits.  RETROPERITONEUM/LYMPH NODES: Small volume right retroperitoneal free   fluid.  ABDOMINAL WALL: Within normal limits.  BONES: Within normal limits.    IMPRESSION:  Right pyelonephritis.  No pulmonary embolism. .    -personally interpreted CXR: clear lungs, no pleural effusions;     -personally reviewed labs:    11.3   12.23 )-----------( 160      ( 28 Aug 2022 19:30 )             34.4       138  |  101  |  12  ----------------------------<  129<H>  4.0   |  24  |  0.71    Ca    9.5      28 Aug 2022 19:30    TPro  8.1  /  Alb  4.2  /  TBili  1.1  /  DBili  x   /  AST  40<H>  /  ALT  92<H>  /  AlkPhos  130<H>      PT/INR - ( 28 Aug 2022 19:30 )   PT: 16.8 sec;   INR: 1.44 ratio       PTT - ( 28 Aug 2022 19:30 )  PTT:30.6 sec    D-dimer: 683  HCG: <5.0    19:30 - VBG - pH: 7.39  | pCO2: 44    | pO2: 25    | Lactate: 1.5      Urinalysis Basic - ( 28 Aug 2022 19:30 )    Color: Yellow / Appearance: Slightly Turbid / S.025 / pH: x  Gluc: x / Ketone: Moderate  / Bili: Negative / Urobili: <2 mg/dL   Blood: x / Protein: 300 mg/dL / Nitrite: Positive   Leuk Esterase: Large / RBC: 3 /HPF /  /HPF   Sq Epi: x / Non Sq Epi: 4 /HPF / Bacteria: Many  RVP positive for Enterovirus.    -personally reviewed: CT ABDOMEN AND PELVIS IC, [facility]      CHEST:  LUNGS AND LARGE AIRWAYS: Patent central airways. No pulmonary nodules or   parenchymal consolidation.  PLEURA: No pleural effusion.  VESSELS: No main, lobar, segmental, or proximal subsegmental pulmonary   embolism. Evaluation of many distal subsegmental pulmonary arteries is   limited secondary to respiratory motion artifact and poor contrast   opacification.  HEART: Heart size is normal. No pericardial effusion.  MEDIASTINUM AND PRITI: No lymphadenopathy.  CHEST WALL AND LOWER NECK: Within normal limits.  ABDOMEN AND PELVIS:  LIVER: Mild periportal edema, nonspecific.  BILE DUCTS: Normal caliber.  GALLBLADDER: Within normal limits.  SPLEEN: Mild splenomegaly, measuring 13 cm in CC dimension..  PANCREAS: Within normal limits.  ADRENALS: Within normal limits.  KIDNEYS/URETERS: Delayed right nephrogram with multiple foci of   wedge-shaped hypoattenuation extending to the cortex. No hydronephrosis.   The left kidney is normal.  BLADDER: Minimally distended.  REPRODUCTIVE ORGANS: Uterus and adnexa within normal limits.  BOWEL: No bowel obstruction. Appendix is not visualized.  PERITONEUM: Small pelvic ascites.  VESSELS: Within normal limits.  RETROPERITONEUM/LYMPH NODES: Small volume right retroperitoneal free   fluid.  ABDOMINAL WALL: Within normal limits.  BONES: Within normal limits.    IMPRESSION:  Right pyelonephritis.  No pulmonary embolism.

## 2022-08-29 NOTE — DISCHARGE NOTE PROVIDER - NSFOLLOWUPCLINICS_GEN_ALL_ED_FT
[facility]  Hematology/Oncology  [address] [id number]  Phone: [phone]  Fax:   Follow Up Time: 1 week    [facility] General Internal Medicine  General Internal Medicine  [address] [place] [address]  Phone: [phone]  Fax:   Follow Up Time: 1-3 days     [facility]  Hematology/Oncology  [address] [id number]  Phone: [phone]  Fax:   Follow Up Time: 1 week    [facility] General Internal Medicine  General Internal Medicine  [address] [place] [address]  Phone: [phone]  Fax:   Follow Up Time: 1-3 days     [facility]  Hematology/Oncology  [address] [id number]  Phone: [phone]  Fax:   Follow Up Time: 1 week    [facility] General Internal Medicine  General Internal Medicine  [address] [place] [address]  Phone: [phone]  Fax:   Follow Up Time: 1-3 days

## 2022-08-29 NOTE — DISCHARGE NOTE PROVIDER - NSDCMRMEDTOKEN_GEN_ALL_CORE_FT
Multiple Vitamins oral tablet: 1 tab(s) orally once a day   ciprofloxacin 500 mg oral tablet: 1 tab(s) orally every 12 hours until 9/6  lactobacillus acidophilus oral capsule: 1 tab(s) orally once a day   Multiple Vitamins oral tablet: 1 tab(s) orally once a day

## 2022-08-29 NOTE — H&P ADULT - NS ATTEND AMEND GEN_ALL_CORE FT
23 y/o Female, Chadian speaking, no significant PMHx a/w sepsis secondary to acute Rt pyelonephritis and co-current entero/rhinovirus infection; Found to have mild splenomegaly;     Assessment and plan was supplemented and modified where indicated; 23 y/o Female, Andorran speaking, no significant PMHx a/w sepsis secondary to acute Rt pyelonephritis and co-current entero/rhinovirus infection; Found to have mild splenomegaly;     Assessment and plan was supplemented and modified where indicated; 21 y/o Female, Angolan speaking, no significant PMHx a/w sepsis secondary to acute Rt pyelonephritis and co-current entero/rhinovirus infection; Found to have mild splenomegaly;     Assessment and plan was supplemented and modified where indicated;

## 2022-08-29 NOTE — H&P ADULT - ASSESSMENT
21 y/o F no significant PMH presents to the ED complaining of abdominal pain, nausea, vomiting for three days. Admitted for sepsis secondary to pyelonephritis and entero/rhinovirus. 23 y/o F no significant PMH presents to the ED complaining of abdominal pain, nausea, vomiting for three days. Admitted for sepsis secondary to pyelonephritis and entero/rhinovirus. 21 y/o Female, Gibraltarian speaking, no significant PMHx a/w sepsis secondary to acute Rt pyelonephritis and co-current entero/rhinovirus infection;  21 y/o Female, Togolese speaking, no significant PMHx a/w sepsis secondary to acute Rt pyelonephritis and co-current entero/rhinovirus infection;  21 y/o Female, Trinidadian speaking, no significant PMHx a/w sepsis secondary to acute Rt pyelonephritis and co-current entero/rhinovirus infection;  23 y/o Female, Faroese speaking, no significant PMHx a/w sepsis secondary to acute Rt pyelonephritis and co-current entero/rhinovirus infection; Found to have mild splenomegaly;  23 y/o Female, Ukrainian speaking, no significant PMHx a/w sepsis secondary to acute Rt pyelonephritis and co-current entero/rhinovirus infection; Found to have mild splenomegaly;  21 y/o Female, Gambian speaking, no significant PMHx a/w sepsis secondary to acute Rt pyelonephritis and co-current entero/rhinovirus infection; Found to have mild splenomegaly;

## 2022-08-29 NOTE — PROGRESS NOTE ADULT - ASSESSMENT
21 y/o Female, Palestinian speaking, no significant PMHx a/w sepsis secondary to acute Rt pyelonephritis and co-current entero/rhinovirus infection; Found to have mild splenomegaly. 21 y/o Female, Tajik speaking, no significant PMHx a/w sepsis secondary to acute Rt pyelonephritis and co-current entero/rhinovirus infection; Found to have mild splenomegaly. 23 y/o Female, Swiss speaking, no significant PMHx a/w sepsis secondary to acute Rt pyelonephritis and co-current entero/rhinovirus infection; Found to have mild splenomegaly.

## 2022-08-29 NOTE — H&P ADULT - RESPIRATORY
normal/clear to auscultation bilaterally clear to auscultation bilaterally/no wheezes/no rales/no rhonchi/no respiratory distress

## 2022-08-29 NOTE — H&P ADULT - LYMPHATIC
No lymphadedenopathy posterior cervical L/posterior cervical R/anterior cervical L/anterior cervical R/No lymphadedenopathy

## 2022-08-29 NOTE — H&P ADULT - PROBLEM SELECTOR PLAN 5
CT showing mild splenomegaly, measuring 13 cm in CC dimension. CT showing mild splenomegaly, measuring 13 cm in CC dimension.  -peripheral blood smear requested as add-on on the morning labs specimen (primary team to f/u)

## 2022-08-29 NOTE — PROGRESS NOTE ADULT - PROBLEM SELECTOR PLAN 1
secondary to pyelonephritis and entero/rhinovirus infection. Fbvk=129.2, Leukocytosis 12.3K; Pt toxic appearing with severe diaphoresis and recurrent fevers. Lactate normal.    - Improving  - C/w zosyn, continues on 75cc/hr NS  - F/u blood and urine cultures  - c/w supportive care for entero/rhinovirus  - HIV negative  - f/u GC secondary to pyelonephritis and entero/rhinovirus infection. Goeh=565.2, Leukocytosis 12.3K; Pt toxic appearing with severe diaphoresis and recurrent fevers. Lactate normal.    - Improving  - C/w zosyn, continues on 75cc/hr NS  - F/u blood and urine cultures  - c/w supportive care for entero/rhinovirus  - HIV negative  - f/u GC secondary to pyelonephritis and entero/rhinovirus infection. Layo=581.2, Leukocytosis 12.3K; Pt toxic appearing with severe diaphoresis and recurrent fevers. Lactate normal.    - Improving  - C/w zosyn, continues on 75cc/hr NS  - F/u blood and urine cultures  - c/w supportive care for entero/rhinovirus  - HIV negative  - f/u GC

## 2022-08-29 NOTE — DISCHARGE NOTE PROVIDER - CARE PROVIDER_API CALL
[facility],   [facility] with Dr Massey  Phone: (   )    -  Fax: (   )    -  Scheduled Appointment: 09/29/2022 10:00 AM   [facility],   [facility] with Dr Massey  Phone: (   )    -  Fax: (   )    -  Scheduled Appointment: 09/29/2022 10:00 AM   [facility],   [facility] with Dr Massey  Phone: (   )    -  Fax: (   )    -  Scheduled Appointment: 09/29/2022 10:00 AM

## 2022-08-29 NOTE — DISCHARGE NOTE PROVIDER - HOSPITAL COURSE
23 y/o F  w/abdominal pain, nausea, vomiting for three days. dx pyelonephritis and entero/rhinovirus. CT w/right pyelonephritis.  treated w/ zosyn, IVF    urine culture.   -CT showing mild splenomegaly, measuring 13 cm in CC dimension.         21 y/o Female, Welsh speaking, no significant PMHx a/w sepsis secondary to acute Rt pyelonephritis and co-current entero/rhinovirus infection; Found to have mild splenomegaly.    Abnormal complete blood count.  -CBC with 0.9% blasts today. Appreciate Hematology input and recommendations. Smear pending.   -Oncology recommendation is stable for discharge with outpatient follow up for 9/25 at 10 am with dr dominguez.     Sepsis.  -secondary to pyelonephritis and entero/rhinovirus infection. Pt toxic appearing with severe diaphoresis and recurrent fevers. Lactate normal.  - RRT 8/29 for T 103 and HR up to 160s  - Broadened to meropenem on 8/29  - UCx with >100k ecoli. Now narrowed to ciprofloxacin per ID (sensitive)  - supportive care for entero/rhinovirus  - HIV negative, GC neg.    Pyelonephritis.  -CT A/P c/w right pyelonephritis.  -R cva tenderness improved. Discharged on Cipro 500mg BID for a total of 10 days.    Elevated LFTs.  -Acute hep panel negative. Possibly 2/2 viral infection  - Repeat as outpatient to ensure normalization or further workup if remains elevated.    Enterovirus infection.  -Supportive care.    Splenomegaly.  -CT showing mild splenomegaly, measuring 13 cm in CC dimension.  ?in setting of acute viral infections. Asymptomatic. No further workup needed at this time. outpatient follow up.    Case discussed with Dr. Velazquez on 9/2/22 and patient cleared for discharge. Reviewed discharge medications with patient; All new medications requiring new prescription sent to pharmacy of patients choice. Reviewed need for prescription for previous home medications and new prescriptions sent if requested. Patient in agreement and understands.         23 y/o Female, Yoruba speaking, no significant PMHx a/w sepsis secondary to acute Rt pyelonephritis and co-current entero/rhinovirus infection; Found to have mild splenomegaly.    Abnormal complete blood count.  -CBC with 0.9% blasts today. Appreciate Hematology input and recommendations. Smear pending.   -Oncology recommendation is stable for discharge with outpatient follow up for 9/25 at 10 am with dr dominguez.     Sepsis.  -secondary to pyelonephritis and entero/rhinovirus infection. Pt toxic appearing with severe diaphoresis and recurrent fevers. Lactate normal.  - RRT 8/29 for T 103 and HR up to 160s  - Broadened to meropenem on 8/29  - UCx with >100k ecoli. Now narrowed to ciprofloxacin per ID (sensitive)  - supportive care for entero/rhinovirus  - HIV negative, GC neg.    Pyelonephritis.  -CT A/P c/w right pyelonephritis.  -R cva tenderness improved. Discharged on Cipro 500mg BID for a total of 10 days.    Elevated LFTs.  -Acute hep panel negative. Possibly 2/2 viral infection  - Repeat as outpatient to ensure normalization or further workup if remains elevated.    Enterovirus infection.  -Supportive care.    Splenomegaly.  -CT showing mild splenomegaly, measuring 13 cm in CC dimension.  ?in setting of acute viral infections. Asymptomatic. No further workup needed at this time. outpatient follow up.    Case discussed with Dr. Velazquez on 9/2/22 and patient cleared for discharge. Reviewed discharge medications with patient; All new medications requiring new prescription sent to pharmacy of patients choice. Reviewed need for prescription for previous home medications and new prescriptions sent if requested. Patient in agreement and understands.         21 y/o Female, Macedonian speaking, no significant PMHx a/w sepsis secondary to acute Rt pyelonephritis and co-current entero/rhinovirus infection; Found to have mild splenomegaly.    Abnormal complete blood count.  -CBC with 0.9% blasts today. Appreciate Hematology input and recommendations. Smear pending.   -Oncology recommendation is stable for discharge with outpatient follow up for 9/25 at 10 am with dr dominguez.     Sepsis.  -secondary to pyelonephritis and entero/rhinovirus infection. Pt toxic appearing with severe diaphoresis and recurrent fevers. Lactate normal.  - RRT 8/29 for T 103 and HR up to 160s  - Broadened to meropenem on 8/29  - UCx with >100k ecoli. Now narrowed to ciprofloxacin per ID (sensitive)  - supportive care for entero/rhinovirus  - HIV negative, GC neg.    Pyelonephritis.  -CT A/P c/w right pyelonephritis.  -R cva tenderness improved. Discharged on Cipro 500mg BID for a total of 10 days.    Elevated LFTs.  -Acute hep panel negative. Possibly 2/2 viral infection  - Repeat as outpatient to ensure normalization or further workup if remains elevated.    Enterovirus infection.  -Supportive care.    Splenomegaly.  -CT showing mild splenomegaly, measuring 13 cm in CC dimension.  ?in setting of acute viral infections. Asymptomatic. No further workup needed at this time. outpatient follow up.    Case discussed with Dr. Velazquez on 9/2/22 and patient cleared for discharge. Reviewed discharge medications with patient; All new medications requiring new prescription sent to pharmacy of patients choice. Reviewed need for prescription for previous home medications and new prescriptions sent if requested. Patient in agreement and understands.         23 y/o Female, Slovak speaking, no significant PMHx a/w sepsis secondary to acute Rt pyelonephritis and co-current entero/rhinovirus infection; Found to have mild splenomegaly.    Abnormal complete blood count.  -CBC with 0.9% blasts today. Appreciate Hematology input and recommendations. Smear pending.   -Oncology recommendation is stable for discharge with outpatient follow up for 9/29 at 10 am with dr dominguez.     Sepsis.  -secondary to pyelonephritis and entero/rhinovirus infection. Pt toxic appearing with severe diaphoresis and recurrent fevers. Lactate normal.  - RRT 8/29 for T 103 and HR up to 160s  - Broadened to meropenem on 8/29  - UCx with >100k ecoli. Now narrowed to ciprofloxacin per ID (sensitive)  - supportive care for entero/rhinovirus  - HIV negative, GC neg.    Pyelonephritis.  -CT A/P c/w right pyelonephritis.  -R cva tenderness improved. Discharged on Cipro 500mg BID for a total of 10 days.    Elevated LFTs.  -Acute hep panel negative. Possibly 2/2 viral infection  - Repeat as outpatient to ensure normalization or further workup if remains elevated.    Enterovirus infection.  -Supportive care.    Splenomegaly.  -CT showing mild splenomegaly, measuring 13 cm in CC dimension.  ?in setting of acute viral infections. Asymptomatic. No further workup needed at this time. outpatient follow up.    Case discussed with Dr. Velazquez on 9/2/22 and patient cleared for discharge. Reviewed discharge medications with patient; All new medications requiring new prescription sent to pharmacy of patients choice. Reviewed need for prescription for previous home medications and new prescriptions sent if requested. Patient in agreement and understands.         23 y/o Female, Occitan speaking, no significant PMHx a/w sepsis secondary to acute Rt pyelonephritis and co-current entero/rhinovirus infection; Found to have mild splenomegaly.    Abnormal complete blood count.  -CBC with 0.9% blasts today. Appreciate Hematology input and recommendations. Smear pending.   -Oncology recommendation is stable for discharge with outpatient follow up for 9/29 at 10 am with dr odminguez.     Sepsis.  -secondary to pyelonephritis and entero/rhinovirus infection. Pt toxic appearing with severe diaphoresis and recurrent fevers. Lactate normal.  - RRT 8/29 for T 103 and HR up to 160s  - Broadened to meropenem on 8/29  - UCx with >100k ecoli. Now narrowed to ciprofloxacin per ID (sensitive)  - supportive care for entero/rhinovirus  - HIV negative, GC neg.    Pyelonephritis.  -CT A/P c/w right pyelonephritis.  -R cva tenderness improved. Discharged on Cipro 500mg BID for a total of 10 days.    Elevated LFTs.  -Acute hep panel negative. Possibly 2/2 viral infection  - Repeat as outpatient to ensure normalization or further workup if remains elevated.    Enterovirus infection.  -Supportive care.    Splenomegaly.  -CT showing mild splenomegaly, measuring 13 cm in CC dimension.  ?in setting of acute viral infections. Asymptomatic. No further workup needed at this time. outpatient follow up.    Case discussed with Dr. Velazquez on 9/2/22 and patient cleared for discharge. Reviewed discharge medications with patient; All new medications requiring new prescription sent to pharmacy of patients choice. Reviewed need for prescription for previous home medications and new prescriptions sent if requested. Patient in agreement and understands.         23 y/o Female, Tajik speaking, no significant PMHx a/w sepsis secondary to acute Rt pyelonephritis and co-current entero/rhinovirus infection; Found to have mild splenomegaly.    Abnormal complete blood count.  -CBC with 0.9% blasts today. Appreciate Hematology input and recommendations. Smear pending.   -Oncology recommendation is stable for discharge with outpatient follow up for 9/29 at 10 am with dr dominguez.     Sepsis.  -secondary to pyelonephritis and entero/rhinovirus infection. Pt toxic appearing with severe diaphoresis and recurrent fevers. Lactate normal.  - RRT 8/29 for T 103 and HR up to 160s  - Broadened to meropenem on 8/29  - UCx with >100k ecoli. Now narrowed to ciprofloxacin per ID (sensitive)  - supportive care for entero/rhinovirus  - HIV negative, GC neg.    Pyelonephritis.  -CT A/P c/w right pyelonephritis.  -R cva tenderness improved. Discharged on Cipro 500mg BID for a total of 10 days.    Elevated LFTs.  -Acute hep panel negative. Possibly 2/2 viral infection  - Repeat as outpatient to ensure normalization or further workup if remains elevated.    Enterovirus infection.  -Supportive care.    Splenomegaly.  -CT showing mild splenomegaly, measuring 13 cm in CC dimension.  ?in setting of acute viral infections. Asymptomatic. No further workup needed at this time. outpatient follow up.    Case discussed with Dr. Velazquez on 9/2/22 and patient cleared for discharge. Reviewed discharge medications with patient; All new medications requiring new prescription sent to pharmacy of patients choice. Reviewed need for prescription for previous home medications and new prescriptions sent if requested. Patient in agreement and understands.         21 y/o Female, Persian speaking, no significant PMHx a/w sepsis secondary to acute Rt pyelonephritis and co-current entero/rhinovirus infection; Found to have mild splenomegaly.    Abnormal complete blood count.  -CBC with 0.9% blasts today. Appreciate Hematology input and recommendations. Rpt cbc is ok, discussed with heme ok for dc and f/u as outpt.   -Oncology recommendation is stable for discharge with outpatient follow up for 9/29 at 10 am with dr dominguez.     Sepsis.  -secondary to pyelonephritis and entero/rhinovirus infection. Pt toxic appearing with severe diaphoresis and recurrent fevers. Lactate normal.  - RRT 8/29 for T 103 and HR up to 160s  - Broadened to meropenem on 8/29  - UCx with >100k ecoli. Now narrowed to ciprofloxacin per ID (sensitive)  - supportive care for entero/rhinovirus  - HIV negative, GC neg.    Pyelonephritis.  -CT A/P c/w right pyelonephritis.  -R cva tenderness improved. Discharged on Cipro 500mg BID for a total of 10 days.    Elevated LFTs.  -Acute hep panel negative. Possibly 2/2 viral infection  - Repeat as outpatient to ensure normalization or further workup if remains elevated.    Enterovirus infection.  -Supportive care.    Splenomegaly.  -CT showing mild splenomegaly, measuring 13 cm in CC dimension.  ?in setting of acute viral infections. Asymptomatic. No further workup needed at this time. outpatient follow up.    Case discussed with Dr. Velazquez on 9/2/22 and patient cleared for discharge. Reviewed discharge medications with patient; All new medications requiring new prescription sent to pharmacy of patients choice. Reviewed need for prescription for previous home medications and new prescriptions sent if requested. Patient in agreement and understands.         21 y/o Female, Malay speaking, no significant PMHx a/w sepsis secondary to acute Rt pyelonephritis and co-current entero/rhinovirus infection; Found to have mild splenomegaly.    Abnormal complete blood count.  -CBC with 0.9% blasts today. Appreciate Hematology input and recommendations. Rpt cbc is ok, discussed with heme ok for dc and f/u as outpt.   -Oncology recommendation is stable for discharge with outpatient follow up for 9/29 at 10 am with dr dominguez.     Sepsis.  -secondary to pyelonephritis and entero/rhinovirus infection. Pt toxic appearing with severe diaphoresis and recurrent fevers. Lactate normal.  - RRT 8/29 for T 103 and HR up to 160s  - Broadened to meropenem on 8/29  - UCx with >100k ecoli. Now narrowed to ciprofloxacin per ID (sensitive)  - supportive care for entero/rhinovirus  - HIV negative, GC neg.    Pyelonephritis.  -CT A/P c/w right pyelonephritis.  -R cva tenderness improved. Discharged on Cipro 500mg BID for a total of 10 days.    Elevated LFTs.  -Acute hep panel negative. Possibly 2/2 viral infection  - Repeat as outpatient to ensure normalization or further workup if remains elevated.    Enterovirus infection.  -Supportive care.    Splenomegaly.  -CT showing mild splenomegaly, measuring 13 cm in CC dimension.  ?in setting of acute viral infections. Asymptomatic. No further workup needed at this time. outpatient follow up.    Case discussed with Dr. Velazquez on 9/2/22 and patient cleared for discharge. Reviewed discharge medications with patient; All new medications requiring new prescription sent to pharmacy of patients choice. Reviewed need for prescription for previous home medications and new prescriptions sent if requested. Patient in agreement and understands.         21 y/o Female, Irish speaking, no significant PMHx a/w sepsis secondary to acute Rt pyelonephritis and co-current entero/rhinovirus infection; Found to have mild splenomegaly.    Abnormal complete blood count.  -CBC with 0.9% blasts today. Appreciate Hematology input and recommendations. Rpt cbc is ok, discussed with heme ok for dc and f/u as outpt.   -Oncology recommendation is stable for discharge with outpatient follow up for 9/29 at 10 am with dr dominguez.     Sepsis.  -secondary to pyelonephritis and entero/rhinovirus infection. Pt toxic appearing with severe diaphoresis and recurrent fevers. Lactate normal.  - RRT 8/29 for T 103 and HR up to 160s  - Broadened to meropenem on 8/29  - UCx with >100k ecoli. Now narrowed to ciprofloxacin per ID (sensitive)  - supportive care for entero/rhinovirus  - HIV negative, GC neg.    Pyelonephritis.  -CT A/P c/w right pyelonephritis.  -R cva tenderness improved. Discharged on Cipro 500mg BID for a total of 10 days.    Elevated LFTs.  -Acute hep panel negative. Possibly 2/2 viral infection  - Repeat as outpatient to ensure normalization or further workup if remains elevated.    Enterovirus infection.  -Supportive care.    Splenomegaly.  -CT showing mild splenomegaly, measuring 13 cm in CC dimension.  ?in setting of acute viral infections. Asymptomatic. No further workup needed at this time. outpatient follow up.    Case discussed with Dr. Velazquez on 9/2/22 and patient cleared for discharge. Reviewed discharge medications with patient; All new medications requiring new prescription sent to pharmacy of patients choice. Reviewed need for prescription for previous home medications and new prescriptions sent if requested. Patient in agreement and understands.

## 2022-08-29 NOTE — H&P ADULT - PROBLEM SELECTOR PLAN 3
Alk phos 130, AST 40, ALT 92.  Hepatitis panel ordered.  Follow up AM CMP. Alk phos 130, AST 40, ALT 92. Likely due to sepsis; r/o viral hepatitis;   Acute hepatitis panel ordered.  Follow up AM CMP.

## 2022-08-29 NOTE — H&P ADULT - GASTROINTESTINAL
details… soft/nondistended/normal active bowel sounds/tender soft/nondistended/normal active bowel sounds/no guarding/no rigidity/tender

## 2022-08-29 NOTE — PROGRESS NOTE ADULT - PROBLEM SELECTOR PLAN 3
Alk phos 130->124  AST 40 -> 82  ALT 92 -> 128  - Follow up hep panel. continue to trend nonweight-bearing

## 2022-08-29 NOTE — H&P ADULT - NEUROLOGICAL
normal/cranial nerves II-XII intact/sensation intact details… cranial nerves II-XII intact/sensation intact/responds to verbal commands

## 2022-08-29 NOTE — DISCHARGE NOTE PROVIDER - PROVIDER TOKENS
FREE:[LAST:[[facility]],PHONE:[(   )    -],FAX:[(   )    -],ADDRESS:[[facility] with Dr Massey],SCHEDULEDAPPT:[09/29/2022],SCHEDULEDAPPTTIME:[10:00 AM]] FREE:[LAST:[[facility]],PHONE:[(   )    -],FAX:[(   )    -],ADDRESS:[[place] with Dr Massey],SCHEDULEDAPPT:[09/29/2022],SCHEDULEDAPPTTIME:[10:00 AM]] FREE:[LAST:[[facility]],PHONE:[(   )    -],FAX:[(   )    -],ADDRESS:[[facility] with Dr Massey],SCHEDULEDAPPT:[09/29/2022],SCHEDULEDAPPTTIME:[10:00 AM]]

## 2022-08-29 NOTE — H&P ADULT - PROBLEM SELECTOR PLAN 1
Admit to medicine, continue monitoring.  Sepsis likely secondary to pyelonephritis and entero/rhinovirus infection.  Blood and urine cultures sent to lab.  Patient started on zosyn.  Vitals q4h.  Patient on NS at 75 ml/hour.  Ibuprofen and acetaminophen PRN for fever.  HIV and Chlamydia/GC ordered.  Monitor closely. Admit to medicine, continue monitoring.  Sepsis secondary to pyelonephritis and entero/rhinovirus infection. Xrxz=690.2, Leukocytosis 12.3K; Pt toxic appearing with severe diaphoresis and recurrent fevers;   Blood and urine cultures sent to lab.  Started Zosyn IV, would continue for 24hrs until urine cx results prior to deescalation    Vitals q4h.  Patient on NS at 75 ml/hour.  Ibuprofen and acetaminophen PRN for fever.  HIV 1/2 screening ordered  Chlamydia/GC RICH ordered   Monitor BP closely Admit to medicine, continue monitoring.  Sepsis secondary to pyelonephritis and entero/rhinovirus infection. Grkv=991.2, Leukocytosis 12.3K; Pt toxic appearing with severe diaphoresis and recurrent fevers;   Blood and urine cultures sent to lab.  Started Zosyn IV, would continue for 24hrs until urine cx results prior to deescalation    Vitals q4h.  Patient on NS at 75 ml/hour.  Ibuprofen and acetaminophen PRN for fever.  HIV 1/2 screening ordered  Chlamydia/GC RICH ordered   Monitor BP closely Admit to medicine, continue monitoring.  Sepsis secondary to pyelonephritis and entero/rhinovirus infection. Gzpe=472.2, Leukocytosis 12.3K; Pt toxic appearing with severe diaphoresis and recurrent fevers;   Blood and urine cultures sent to lab.  Started Zosyn IV, would continue for 24hrs until urine cx results prior to deescalation    Vitals q4h.  Patient on NS at 75 ml/hour.  Ibuprofen and acetaminophen PRN for fever.  HIV 1/2 screening ordered  Chlamydia/GC RICH ordered   Monitor BP closely

## 2022-08-29 NOTE — CHART NOTE - NSCHARTNOTEFT_GEN_A_CORE
RRT called by RN for tachycardia and fever; pt admitted for pyelonephritis  IV Tylenol administered in RRT; EKG in RRT w/ 117bpm HR and Sinus Tachycardia    Discussed with Attending Dr. Viramontes, will send repeat blood and urine cultures, then broaden abx to Meropenum.

## 2022-08-29 NOTE — RAPID RESPONSE TEAM SUMMARY - NSSITUATIONBACKGROUNDRRT_GEN_ALL_CORE
22F with no significant PMH a/w sepsis 2/2 pyelonephritis. RRT was called for tachycardia to 180's. Per RN at bedside, patient awoke from a nightmare and appeared very anxious. During RRT, patient febrile to 103 (axillary), -180s, /86, RR 20 (O2 sat 100% on RA). Patient with appropriate abx coverage. Administered IV Tylenol 1g and checked EKG, which was ST to 115. Plan to treat sepsis discussed with primary team at bedside.

## 2022-08-29 NOTE — PATIENT PROFILE ADULT - FALL HARM RISK - HARM RISK INTERVENTIONS
Assistance with ambulation/Assistance OOB with selected safe patient handling equipment/Communicate Risk of Fall with Harm to all staff/Reinforce activity limits and safety measures with patient and family/Tailored Fall Risk Interventions/Visual Cue: Yellow wristband and red socks/Bed in lowest position, wheels locked, appropriate side rails in place/Call bell, personal items and telephone in reach/Instruct patient to call for assistance before getting out of bed or chair/Non-slip footwear when patient is out of bed/[place] to call system/Physically safe environment - no spills, clutter or unnecessary equipment/Purposeful Proactive Rounding/Room/bathroom lighting operational, light cord in reach Assistance with ambulation/Assistance OOB with selected safe patient handling equipment/Communicate Risk of Fall with Harm to all staff/Reinforce activity limits and safety measures with patient and family/Tailored Fall Risk Interventions/Visual Cue: Yellow wristband and red socks/Bed in lowest position, wheels locked, appropriate side rails in place/Call bell, personal items and telephone in reach/Instruct patient to call for assistance before getting out of bed or chair/Non-slip footwear when patient is out of bed/[place] to call system/Physically safe environment - no spills, clutter or unnecessary equipment/Purposeful Proactive Rounding/Room/bathroom lighting operational, light cord in reach Assistance with ambulation/Assistance OOB with selected safe patient handling equipment/Communicate Risk of Fall with Harm to all staff/Reinforce activity limits and safety measures with patient and family/Tailored Fall Risk Interventions/Visual Cue: Yellow wristband and red socks/Bed in lowest position, wheels locked, appropriate side rails in place/Call bell, personal items and telephone in reach/Instruct patient to call for assistance before getting out of bed or chair/Non-slip footwear when patient is out of bed/[place] to call system/Physically safe environment - no spills, clutter or unnecessary equipment/Purposeful Proactive Rounding/Room/bathroom lighting operational, light cord in reach

## 2022-08-29 NOTE — PROGRESS NOTE ADULT - PROBLEM SELECTOR PLAN 2
UA: (+) pyuria, (+) nitrates, jonh LE, (+) bacteruria   CT A/P c/w right pyelonephritis.  Follow up urine culture., continue w/ Zosyn  R cva tenderness improving

## 2022-08-29 NOTE — H&P ADULT - NSHPSOCIALHISTORY_GEN_ALL_CORE
Patient denies use of cigarettes, alcohol or drugs. 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 Patient denies use of cigarettes, alcohol or drugs. 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  Ambulatory without assistive devices

## 2022-08-29 NOTE — H&P ADULT - NSHPPHYSICALEXAM_GEN_ALL_CORE
Vital Signs Last 24 Hrs  T(C): 39.6 (29 Aug 2022 01:58), Max: 39.6 (29 Aug 2022 01:58)  T(F): 103.2 (29 Aug 2022 01:58), Max: 103.2 (29 Aug 2022 01:58)  HR: 103 (29 Aug 2022 01:58) (92 - 111)  BP: 104/48 (29 Aug 2022 01:58) (93/46 - 107/56)  BP(mean): --  RR: 17 (29 Aug 2022 01:58) (17 - 18)  SpO2: 100% (29 Aug 2022 01:58) (99% - 100%)    Parameters below as of 29 Aug 2022 01:58  Patient On (Oxygen Delivery Method): room air

## 2022-08-29 NOTE — DISCHARGE NOTE PROVIDER - NSDCCPCAREPLAN_GEN_ALL_CORE_FT
PRINCIPAL DISCHARGE DIAGNOSIS  Diagnosis: Pyelonephritis  Assessment and Plan of Treatment: Continue antibiotics as directed until 9/6/22 and monitor for signs/symptoms of infection, such as, fever/chills, burning/pain with urination, urinary frequency/hesitancy, cloudy urine, or blood in urine. Please follow up with your primary care physician regarding your hospitalization        SECONDARY DISCHARGE DIAGNOSES  Diagnosis: Abnormal complete blood count  Assessment and Plan of Treatment: Your blood work showed some abnormalities. Please closely follow up with [facility] (henatology/blood doctor) for furhter workup. Please follow up with your primary care physician regarding your hospitalization. Follow up appointment on September 29 at 10 am with Dr. Massey (hematology/blood doctor).    Diagnosis: Splenomegaly  Assessment and Plan of Treatment: Your CT scan showed that your spleen was mildly enlarged. It is likely large due to your viral infection. You have no other symptoms.  No further workup needed at this time. Please follow up with your primary care doctor for a repeat scan and further management.    Diagnosis: Enterovirus infection  Assessment and Plan of Treatment: Continue supportive care. Please follow up with your primary care physician regarding your hospitalization      Diagnosis: Elevated LFTs  Assessment and Plan of Treatment: Please follow up with your primary care doctor to repeat your blood work and further managment/monitoring.     PRINCIPAL DISCHARGE DIAGNOSIS  Diagnosis: Pyelonephritis  Assessment and Plan of Treatment: Continue antibiotics as directed until 9/6/22 and monitor for signs/symptoms of infection, such as, fever/chills, burning/pain with urination, urinary frequency/hesitancy, cloudy urine, or blood in urine. Please follow up with your primary care physician regarding your hospitalization        SECONDARY DISCHARGE DIAGNOSES  Diagnosis: Abnormal complete blood count  Assessment and Plan of Treatment: Your blood work showed some abnormalities. Please closely follow up with [facility] (henatology/blood doctor) for furhter workup. Please follow up with your primary care physician regarding your hospitalization. Follow up appointment on September 29 at 10 am with Dr. Masesy (hematology/blood doctor).    Diagnosis: Splenomegaly  Assessment and Plan of Treatment: Your CT scan showed that your spleen was mildly enlarged. It is likely large due to your viral infection. You have no other symptoms.  No further workup needed at this time. Please follow up with your primary care doctor for a repeat scan and further management.    Diagnosis: Enterovirus infection  Assessment and Plan of Treatment: Continue supportive care. Please follow up with your primary care physician regarding your hospitalization      Diagnosis: Elevated LFTs  Assessment and Plan of Treatment: Please follow up with your primary care doctor to repeat your blood work and further managment/monitoring.     PRINCIPAL DISCHARGE DIAGNOSIS  Diagnosis: Pyelonephritis  Assessment and Plan of Treatment: Continue antibiotics as directed until 9/6/22 and monitor for signs/symptoms of infection, such as, fever/chills, burning/pain with urination, urinary frequency/hesitancy, cloudy urine, or blood in urine. Please follow up with your primary care physician regarding your hospitalization        SECONDARY DISCHARGE DIAGNOSES  Diagnosis: Abnormal complete blood count  Assessment and Plan of Treatment: Your blood work showed some abnormalities. Please closely follow up with [facility] (henatology/blood doctor) for furhter workup. Please follow up with your primary care physician regarding your hospitalization. Follow up appointment on September 29 at 10 am with Dr. Massey (hematology/blood doctor).    Diagnosis: Splenomegaly  Assessment and Plan of Treatment: Your CT scan showed that your spleen was mildly enlarged. It is likely large due to your viral infection. You have no other symptoms.  No further workup needed at this time. Please follow up with your primary care doctor for a repeat scan and further management.    Diagnosis: Enterovirus infection  Assessment and Plan of Treatment: Continue supportive care. Please follow up with your primary care physician regarding your hospitalization      Diagnosis: Elevated LFTs  Assessment and Plan of Treatment: Please follow up with your primary care doctor to repeat your blood work and further managment/monitoring.

## 2022-08-29 NOTE — H&P ADULT - PROBLEM SELECTOR PLAN 2
CT showing right pyelonephritis.  Follow up urine culture.  Patient on zosyn.  Plan as above. CT A/P c/w right pyelonephritis.  Follow up urine culture.  Zosyn IV, would continue for 24hrs until urine cx results prior to deescalation    Plan as above. UA: (+) pyuria, (+) nitrates, jonh LE, (+) bacteruria   CT A/P c/w right pyelonephritis.  Follow up urine culture.  Zosyn IV, would continue for 24hrs until urine cx results prior to deescalation    Plan as above

## 2022-08-29 NOTE — PROGRESS NOTE ADULT - SUBJECTIVE AND OBJECTIVE BOX
[facility]  Kimberly Viramontes MD  Pager #[phone]    Patient is a 22y old  Female who presents with a chief complaint of Fever, headache, abd pain (29 Aug 2022 08:20)    SUBJECTIVE / OVERNIGHT EVENTS: No acute events.  #[id number]. Feeling improved from prior. Still with right sided back/abdominal discomfort. No recent fever, chills, nausea, vomiting. No dysuria.     MEDICATIONS  (STANDING):  enoxaparin Injectable 40 milliGRAM(s) SubCutaneous every 24 hours  multivitamin 1 Tablet(s) Oral daily  piperacillin/tazobactam IVPB.. 3.375 Gram(s) IV Intermittent every 8 hours  potassium phosphate / sodium phosphate Tablet (K-PHOS No. 2) 1 Tablet(s) Oral three times a day  sodium chloride 0.9% lock flush 3 milliLiter(s) IV Push every 8 hours  sodium chloride 0.9%. 1000 milliLiter(s) (75 mL/Hr) IV Continuous <Continuous>    MEDICATIONS  (PRN):    CAPILLARY BLOOD GLUCOSE    I&O's Summary    PHYSICAL EXAM:  Vital Signs Last 24 Hrs  T(C): 36.8 (29 Aug 2022 07:15), Max: 39.6 (29 Aug 2022 01:58)  T(F): 98.2 (29 Aug 2022 07:15), Max: 103.2 (29 Aug 2022 01:58)  HR: 87 (29 Aug 2022 07:15) (81 - 111)  BP: 100/55 (29 Aug 2022 07:15) (93/46 - 124/55)  BP(mean): --  RR: 16 (29 Aug 2022 07:15) (16 - 18)  SpO2: 99% (29 Aug 2022 07:15) (99% - 100%)    Parameters below as of 29 Aug 2022 07:15  Patient On (Oxygen Delivery Method): room air    CONSTITUTIONAL: NAD, well-developed, well-groomed  EYES: EOMI; conjunctiva and sclera clear  ENMT: Moist mucosa  RESPIRATORY: Normal respiratory effort; lungs are clear to auscultation bilaterally  CARDIOVASCULAR: Regular rate and rhythm, normal S1 and S2, No lower extremity edema; Peripheral pulses are 2+ bilaterally  ABDOMEN: Nontender to palpation, normoactive bowel sounds, no rebound/guarding  +Right CVA tenderness  PSYCH: calm; affect appropriate  NEUROLOGY: moving all extremities; no gross sensory deficits   SKIN: No rashes; no palpable lesions    LABS:                        10.5   9.31  )-----------( 129      ( 29 Aug 2022 06:28 )             31.7         137  |  107  |  9   ----------------------------<  115<H>  3.7   |  18<L>  |  0.77    Ca    8.1<L>      29 Aug 2022 06:28  Phos  2.4       Mg     1.90         TPro  6.7  /  Alb  3.3  /  TBili  1.0  /  DBili  x   /  AST  82<H>  /  ALT  128<H>  /  AlkPhos  124<H>      PT/INR - ( 28 Aug 2022 19:30 )   PT: 16.8 sec;   INR: 1.44 ratio      PTT - ( 28 Aug 2022 19:30 )  PTT:30.6 sec    Urinalysis Basic - ( 28 Aug 2022 19:30 )    Color: Yellow / Appearance: Slightly Turbid / S.025 / pH: x  Gluc: x / Ketone: Moderate  / Bili: Negative / Urobili: <2 mg/dL   Blood: x / Protein: 300 mg/dL / Nitrite: Positive   Leuk Esterase: Large / RBC: 3 /HPF /  /HPF   Sq Epi: x / Non Sq Epi: 4 /HPF / Bacteria: Many    RADIOLOGY & ADDITIONAL TESTS: Reviewed    COORDINATION OF CARE:  Care Discussed with Consultants/Other Providers [Y- Medicine ACP] [facility]  Kimberly Viramontes MD  Pager #[phone]    Patient is a 22y old  Female who presents with a chief complaint of Fever, headache, abd pain (29 Aug 2022 08:20)    SUBJECTIVE / OVERNIGHT EVENTS: No acute events.  #[id number]. Feeling improved from prior. Still with right sided back/abdominal discomfort. No recent fever, chills, nausea, vomiting. No dysuria.     MEDICATIONS  (STANDING):  enoxaparin Injectable 40 milliGRAM(s) SubCutaneous every 24 hours  multivitamin 1 Tablet(s) Oral daily  piperacillin/tazobactam IVPB.. 3.375 Gram(s) IV Intermittent every 8 hours  potassium phosphate / sodium phosphate Tablet (K-PHOS No. 2) 1 Tablet(s) Oral three times a day  sodium chloride 0.9% lock flush 3 milliLiter(s) IV Push every 8 hours  sodium chloride 0.9%. 1000 milliLiter(s) (75 mL/Hr) IV Continuous <Continuous>    MEDICATIONS  (PRN):    CAPILLARY BLOOD GLUCOSE    I&O's Summary    PHYSICAL EXAM:  Vital Signs Last 24 Hrs  T(C): 36.8 (29 Aug 2022 07:15), Max: 39.6 (29 Aug 2022 01:58)  T(F): 98.2 (29 Aug 2022 07:15), Max: 103.2 (29 Aug 2022 01:58)  HR: 87 (29 Aug 2022 07:15) (81 - 111)  BP: 100/55 (29 Aug 2022 07:15) (93/46 - 124/55)  BP(mean): --  RR: 16 (29 Aug 2022 07:15) (16 - 18)  SpO2: 99% (29 Aug 2022 07:15) (99% - 100%)    Parameters below as of 29 Aug 2022 07:15  Patient On (Oxygen Delivery Method): room air    CONSTITUTIONAL: NAD, well-developed, well-groomed  EYES: EOMI; conjunctiva and sclera clear  ENMT: Moist mucosa  RESPIRATORY: Normal respiratory effort; lungs are clear to auscultation bilaterally  CARDIOVASCULAR: Regular rate and rhythm, normal S1 and S2, No lower extremity edema; Peripheral pulses are 2+ bilaterally  ABDOMEN: Nontender to palpation, normoactive bowel sounds, no rebound/guarding  +Right CVA tenderness  PSYCH: calm; affect appropriate  NEUROLOGY: moving all extremities; no gross sensory deficits   SKIN: No rashes; no palpable lesions    LABS:                        10.5   9.31  )-----------( 129      ( 29 Aug 2022 06:28 )             31.7         137  |  107  |  9   ----------------------------<  115<H>  3.7   |  18<L>  |  0.77    Ca    8.1<L>      29 Aug 2022 06:28  Phos  2.4       Mg     1.90         TPro  6.7  /  Alb  3.3  /  TBili  1.0  /  DBili  x   /  AST  82<H>  /  ALT  128<H>  /  AlkPhos  124<H>      PT/INR - ( 28 Aug 2022 19:30 )   PT: 16.8 sec;   INR: 1.44 ratio      PTT - ( 28 Aug 2022 19:30 )  PTT:30.6 sec    Urinalysis Basic - ( 28 Aug 2022 19:30 )    Color: Yellow / Appearance: Slightly Turbid / S.025 / pH: x  Gluc: x / Ketone: Moderate  / Bili: Negative / Urobili: <2 mg/dL   Blood: x / Protein: 300 mg/dL / Nitrite: Positive   Leuk Esterase: Large / RBC: 3 /HPF /  /HPF   Sq Epi: x / Non Sq Epi: 4 /HPF / Bacteria: Many    RADIOLOGY & ADDITIONAL TESTS: Reviewed    COORDINATION OF CARE:  Care Discussed with Consultants/Other Providers [Y- Medicine ACP] [facility]  Kimberly Viramontes MD  Pager #[phone]    Patient is a 22y old  Female who presents with a chief complaint of Fever, headache, abd pain (29 Aug 2022 08:20)    SUBJECTIVE / OVERNIGHT EVENTS: No acute events.  #[id number]. Feeling improved from prior. Still with right sided back/abdominal discomfort. No recent fever, chills, nausea, vomiting. No dysuria.     MEDICATIONS  (STANDING):  enoxaparin Injectable 40 milliGRAM(s) SubCutaneous every 24 hours  multivitamin 1 Tablet(s) Oral daily  piperacillin/tazobactam IVPB.. 3.375 Gram(s) IV Intermittent every 8 hours  potassium phosphate / sodium phosphate Tablet (K-PHOS No. 2) 1 Tablet(s) Oral three times a day  sodium chloride 0.9% lock flush 3 milliLiter(s) IV Push every 8 hours  sodium chloride 0.9%. 1000 milliLiter(s) (75 mL/Hr) IV Continuous <Continuous>    MEDICATIONS  (PRN):    CAPILLARY BLOOD GLUCOSE    I&O's Summary    PHYSICAL EXAM:  Vital Signs Last 24 Hrs  T(C): 36.8 (29 Aug 2022 07:15), Max: 39.6 (29 Aug 2022 01:58)  T(F): 98.2 (29 Aug 2022 07:15), Max: 103.2 (29 Aug 2022 01:58)  HR: 87 (29 Aug 2022 07:15) (81 - 111)  BP: 100/55 (29 Aug 2022 07:15) (93/46 - 124/55)  BP(mean): --  RR: 16 (29 Aug 2022 07:15) (16 - 18)  SpO2: 99% (29 Aug 2022 07:15) (99% - 100%)    Parameters below as of 29 Aug 2022 07:15  Patient On (Oxygen Delivery Method): room air    CONSTITUTIONAL: NAD, well-developed, well-groomed  EYES: EOMI; conjunctiva and sclera clear  ENMT: Moist mucosa  RESPIRATORY: Normal respiratory effort; lungs are clear to auscultation bilaterally  CARDIOVASCULAR: Regular rate and rhythm, normal S1 and S2, No lower extremity edema; Peripheral pulses are 2+ bilaterally  ABDOMEN: Nontender to palpation, normoactive bowel sounds, no rebound/guarding  +Right CVA tenderness  PSYCH: calm; affect appropriate  NEUROLOGY: moving all extremities; no gross sensory deficits   SKIN: No rashes; no palpable lesions    LABS:                        10.5   9.31  )-----------( 129      ( 29 Aug 2022 06:28 )             31.7         137  |  107  |  9   ----------------------------<  115<H>  3.7   |  18<L>  |  0.77    Ca    8.1<L>      29 Aug 2022 06:28  Phos  2.4       Mg     1.90         TPro  6.7  /  Alb  3.3  /  TBili  1.0  /  DBili  x   /  AST  82<H>  /  ALT  128<H>  /  AlkPhos  124<H>      PT/INR - ( 28 Aug 2022 19:30 )   PT: 16.8 sec;   INR: 1.44 ratio      PTT - ( 28 Aug 2022 19:30 )  PTT:30.6 sec    Urinalysis Basic - ( 28 Aug 2022 19:30 )    Color: Yellow / Appearance: Slightly Turbid / S.025 / pH: x  Gluc: x / Ketone: Moderate  / Bili: Negative / Urobili: <2 mg/dL   Blood: x / Protein: 300 mg/dL / Nitrite: Positive   Leuk Esterase: Large / RBC: 3 /HPF /  /HPF   Sq Epi: x / Non Sq Epi: 4 /HPF / Bacteria: Many    RADIOLOGY & ADDITIONAL TESTS: Reviewed    COORDINATION OF CARE:  Care Discussed with Consultants/Other Providers [Y- Medicine ACP]

## 2022-08-29 NOTE — H&P ADULT - PSYCHIATRIC
details… PAST SURGICAL HISTORY:  Cataract     H/O hernia repair 1970's    History of ankle surgery right orif    History of appendectomy     Post PTCA 8 stents    S/P CABG x 2 s/p C2L/TV Repair/MVR/ NISSA Clip normal/normal affect/alert and oriented x3/normal behavior normal affect/alert and oriented x3/normal behavior

## 2022-08-29 NOTE — H&P ADULT - HISTORY OF PRESENT ILLNESS
21 y/o F no significant PMH presents to the ED complaining of abdominal pain, nausea, vomiting for three days. Patient states that pain started in her upper abdomen and lower chest area. She describes as sharp and radiating to her right flank area. Patient states pain is worse when standing up. Patient endorses multiple episodes of non-bilious vomiting. Patient also endorses non-bloody diarrhea also. Patient denies dysuria and increased urinary frequency. Patient states pain is worse when she takes in a deep breath. Patient endorses fever, chills and malaise at home. Patient also endorses dyspnea on exertion. Patient denies recent travel, sick contacts, cough. Patient denies oral contraceptive use but does endorse having contraceptive implant in left arm.    In ED UA was found to be positive for infection. CTAP w/IV contrast showed right pyelonephritis. Labs were significant for leukocytosis of 12.23, bandemia of 11.5, D-dimer of 683 and transaminitis. Patient was also found to be Enterovirus positive. 23 y/o F no significant PMH presents to the ED complaining of abdominal pain, nausea, vomiting for three days. Patient states that pain started in her upper abdomen and lower chest area. She describes as sharp and radiating to her right flank area. Patient states pain is worse when standing up. Patient endorses multiple episodes of non-bilious vomiting. Patient also endorses non-bloody diarrhea also. Patient denies dysuria and increased urinary frequency. Patient states pain is worse when she takes in a deep breath. Patient endorses fever, chills and malaise at home. Patient also endorses dyspnea on exertion. Patient denies recent travel, sick contacts, cough. Patient denies oral contraceptive use but does endorse having contraceptive implant in left arm.    In ED UA was found to be positive for infection. CTAP w/IV contrast showed right pyelonephritis. Labs were significant for leukocytosis of 12.23, bandemia of 11.5, D-dimer of 683 and transaminitis. Patient was also found to be Enterovirus positive. 23 y/o Female, Marshallese speaking, no significant PMHx presents to the ED complaining of abdominal pain, nausea, vomiting for three days. Patient states that pain, 7/10, started in her upper abdomen and Rt lower chest area. She describes as sharp and radiating to her right flank area. Patient states pain is worse when standing up. Patient endorses multiple episodes of non-bilious vomiting. Patient also endorses non-bloody diarrhea also. Patient denies dysuria and increased urinary frequency. Patient states pain is worse when she takes in a deep breath. Patient endorses fever, chills and malaise at home. Patient also endorses dyspnea on exertion. Patient denies recent travel, sick contacts, cough. Patient denies oral contraceptive use but does endorse having contraceptive implant in left arm.    In ED UA was found to be positive for infection. CTAP w/IV contrast showed right pyelonephritis. Labs were significant for leukocytosis of 12.23, bandemia of 11.5, D-dimer of 683 and transaminitis. Patient was also found to be Enterovirus positive. 21 y/o Female, Brazilian speaking, no significant PMHx presents to the ED complaining of abdominal pain, nausea, vomiting for three days. Patient states that pain, 7/10, started in her upper abdomen and Rt lower chest area. She describes as sharp and radiating to her right flank area. Patient states pain is worse when standing up. Patient endorses multiple episodes of non-bilious vomiting. Patient also endorses non-bloody diarrhea also. Patient denies dysuria and increased urinary frequency. Patient states pain is worse when she takes in a deep breath. Patient endorses fever, chills and malaise at home. Patient also endorses dyspnea on exertion. Patient denies recent travel, sick contacts, cough. Patient denies oral contraceptive use but does endorse having contraceptive implant in left arm.    In ED UA was found to be positive for infection. CTAP w/IV contrast showed right pyelonephritis. Labs were significant for leukocytosis of 12.23, bandemia of 11.5, D-dimer of 683 and transaminitis. Patient was also found to be Enterovirus positive. 23 y/o Female, Grenadian speaking, no significant PMHx presents to the ED complaining of abdominal pain, nausea, vomiting for three days. Patient states that pain, 7/10, started in her upper abdomen and Rt lower chest area. She describes as sharp and radiating to her right flank area. Patient states pain is worse when standing up. Patient endorses multiple episodes of non-bilious vomiting. Patient also endorses non-bloody diarrhea also. Patient denies dysuria and increased urinary frequency. Patient states pain is worse when she takes in a deep breath. Patient endorses fever, chills and malaise at home. Patient also endorses dyspnea on exertion. Patient denies recent travel, sick contacts, cough. Patient denies oral contraceptive use but does endorse having contraceptive implant in left arm.    In ED UA was found to be positive for infection. CTAP w/IV contrast showed right pyelonephritis. Labs were significant for leukocytosis of 12.23, bandemia of 11.5, D-dimer of 683 and transaminitis. Patient was also found to be Enterovirus positive.

## 2022-08-30 LAB
ALBUMIN SERPL ELPH-MCNC: 3.1 G/DL — LOW (ref 3.3–5)
ALP SERPL-CCNC: 124 U/L — HIGH (ref 40–120)
ALT FLD-CCNC: 96 U/L — HIGH (ref 4–33)
ANION GAP SERPL CALC-SCNC: 11 MMOL/L — SIGNIFICANT CHANGE UP (ref 7–14)
AST SERPL-CCNC: 36 U/L — HIGH (ref 4–32)
BILIRUB SERPL-MCNC: 0.5 MG/DL — SIGNIFICANT CHANGE UP (ref 0.2–1.2)
BUN SERPL-MCNC: 6 MG/DL — LOW (ref 7–23)
CALCIUM SERPL-MCNC: 8.3 MG/DL — LOW (ref 8.4–10.5)
CHLORIDE SERPL-SCNC: 107 MMOL/L — SIGNIFICANT CHANGE UP (ref 98–107)
CO2 SERPL-SCNC: 19 MMOL/L — LOW (ref 22–31)
CREAT SERPL-MCNC: 0.57 MG/DL — SIGNIFICANT CHANGE UP (ref 0.5–1.3)
EGFR: 133 ML/MIN/1.73M2 — SIGNIFICANT CHANGE UP
FERRITIN SERPL-MCNC: 381 NG/ML — HIGH (ref 15–150)
GLUCOSE SERPL-MCNC: 96 MG/DL — SIGNIFICANT CHANGE UP (ref 70–99)
HCT VFR BLD CALC: 28.6 % — LOW (ref 34.5–45)
HGB BLD-MCNC: 9.5 G/DL — LOW (ref 11.5–15.5)
IRON SATN MFR SERPL: 16 UG/DL — LOW (ref 30–160)
IRON SATN MFR SERPL: 9 % — LOW (ref 14–50)
MAGNESIUM SERPL-MCNC: 1.7 MG/DL — SIGNIFICANT CHANGE UP (ref 1.6–2.6)
MCHC RBC-ENTMCNC: 28.6 PG — SIGNIFICANT CHANGE UP (ref 27–34)
MCHC RBC-ENTMCNC: 33.2 GM/DL — SIGNIFICANT CHANGE UP (ref 32–36)
MCV RBC AUTO: 86.1 FL — SIGNIFICANT CHANGE UP (ref 80–100)
NRBC # BLD: 0 /100 WBCS — SIGNIFICANT CHANGE UP (ref 0–0)
NRBC # FLD: 0 K/UL — SIGNIFICANT CHANGE UP (ref 0–0)
PHOSPHATE SERPL-MCNC: 2.4 MG/DL — LOW (ref 2.5–4.5)
PLATELET # BLD AUTO: 160 K/UL — SIGNIFICANT CHANGE UP (ref 150–400)
POTASSIUM SERPL-MCNC: 4.3 MMOL/L — SIGNIFICANT CHANGE UP (ref 3.5–5.3)
POTASSIUM SERPL-SCNC: 4.3 MMOL/L — SIGNIFICANT CHANGE UP (ref 3.5–5.3)
PROT SERPL-MCNC: 6.2 G/DL — SIGNIFICANT CHANGE UP (ref 6–8.3)
RBC # BLD: 3.32 M/UL — LOW (ref 3.8–5.2)
RBC # FLD: 14.6 % — HIGH (ref 10.3–14.5)
SODIUM SERPL-SCNC: 137 MMOL/L — SIGNIFICANT CHANGE UP (ref 135–145)
TIBC SERPL-MCNC: 176 UG/DL — LOW (ref 220–430)
UIBC SERPL-MCNC: 160 UG/DL — SIGNIFICANT CHANGE UP (ref 110–370)
WBC # BLD: 9.57 K/UL — SIGNIFICANT CHANGE UP (ref 3.8–10.5)
WBC # FLD AUTO: 9.57 K/UL — SIGNIFICANT CHANGE UP (ref 3.8–10.5)

## 2022-08-30 PROCEDURE — 99233 SBSQ HOSP IP/OBS HIGH 50: CPT

## 2022-08-30 RX ORDER — LACTOBACILLUS ACIDOPHILUS 100MM CELL
1 CAPSULE ORAL DAILY
Refills: 0 | Status: DISCONTINUED | OUTPATIENT
Start: 2022-08-30 | End: 2022-09-02

## 2022-08-30 RX ORDER — ACETAMINOPHEN 500 MG
750 TABLET ORAL ONCE
Refills: 0 | Status: COMPLETED | OUTPATIENT
Start: 2022-08-30 | End: 2022-08-30

## 2022-08-30 RX ADMIN — Medication 650 MILLIGRAM(S): at 03:04

## 2022-08-30 RX ADMIN — MEROPENEM 100 MILLIGRAM(S): 1 INJECTION INTRAVENOUS at 13:01

## 2022-08-30 RX ADMIN — SODIUM CHLORIDE 3 MILLILITER(S): 9 INJECTION INTRAMUSCULAR; INTRAVENOUS; SUBCUTANEOUS at 05:24

## 2022-08-30 RX ADMIN — Medication 650 MILLIGRAM(S): at 09:14

## 2022-08-30 RX ADMIN — Medication 1 TABLET(S): at 11:03

## 2022-08-30 RX ADMIN — Medication 650 MILLIGRAM(S): at 09:44

## 2022-08-30 RX ADMIN — MEROPENEM 100 MILLIGRAM(S): 1 INJECTION INTRAVENOUS at 02:51

## 2022-08-30 RX ADMIN — Medication 650 MILLIGRAM(S): at 01:47

## 2022-08-30 RX ADMIN — ENOXAPARIN SODIUM 40 MILLIGRAM(S): 100 INJECTION SUBCUTANEOUS at 07:34

## 2022-08-30 RX ADMIN — SODIUM CHLORIDE 3 MILLILITER(S): 9 INJECTION INTRAMUSCULAR; INTRAVENOUS; SUBCUTANEOUS at 12:59

## 2022-08-30 RX ADMIN — Medication 300 MILLIGRAM(S): at 18:14

## 2022-08-30 RX ADMIN — SODIUM CHLORIDE 75 MILLILITER(S): 9 INJECTION INTRAMUSCULAR; INTRAVENOUS; SUBCUTANEOUS at 17:31

## 2022-08-30 RX ADMIN — Medication 63.75 MILLIMOLE(S): at 09:02

## 2022-08-30 RX ADMIN — MEROPENEM 100 MILLIGRAM(S): 1 INJECTION INTRAVENOUS at 21:57

## 2022-08-30 RX ADMIN — Medication 1 TABLET(S): at 05:45

## 2022-08-30 RX ADMIN — Medication 750 MILLIGRAM(S): at 20:47

## 2022-08-30 NOTE — PROGRESS NOTE ADULT - PROBLEM SELECTOR PLAN 1
secondary to pyelonephritis and entero/rhinovirus infection. Vvyu=585.2, Leukocytosis 12.3K; Pt toxic appearing with severe diaphoresis and recurrent fevers. Lactate normal.    - RRT 8/29 for T 103 and HR up to 160s  - Broadened to meropenem on 8/29  - UCx with >100k ecoli, f/u sensitivities. Prelim bcx neg. Repeat blood cultures sent 8/29 after rapid response, f/u  - c/w NS 75cc/hr, no signs of volume overload  - c/w supportive care for entero/rhinovirus  - HIV negative  - f/u GC, although lower suspicion for causing acute presentation secondary to pyelonephritis and entero/rhinovirus infection. Vpdh=898.2, Leukocytosis 12.3K; Pt toxic appearing with severe diaphoresis and recurrent fevers. Lactate normal.    - RRT 8/29 for T 103 and HR up to 160s  - Broadened to meropenem on 8/29  - UCx with >100k ecoli, f/u sensitivities. Prelim bcx neg. Repeat blood cultures sent 8/29 after rapid response, f/u  - c/w NS 75cc/hr, no signs of volume overload  - c/w supportive care for entero/rhinovirus  - HIV negative  - f/u GC, although lower suspicion for causing acute presentation secondary to pyelonephritis and entero/rhinovirus infection. Uoaa=164.2, Leukocytosis 12.3K; Pt toxic appearing with severe diaphoresis and recurrent fevers. Lactate normal.    - RRT 8/29 for T 103 and HR up to 160s  - Broadened to meropenem on 8/29  - UCx with >100k ecoli, f/u sensitivities. Prelim bcx neg. Repeat blood cultures sent 8/29 after rapid response, f/u  - c/w NS 75cc/hr, no signs of volume overload  - c/w supportive care for entero/rhinovirus  - HIV negative  - f/u GC, although lower suspicion for causing acute presentation

## 2022-08-30 NOTE — PROGRESS NOTE ADULT - ASSESSMENT
21 y/o Female, Chadian speaking, no significant PMHx a/w sepsis secondary to acute Rt pyelonephritis and co-current entero/rhinovirus infection; Found to have mild splenomegaly. 21 y/o Female, Indian speaking, no significant PMHx a/w sepsis secondary to acute Rt pyelonephritis and co-current entero/rhinovirus infection; Found to have mild splenomegaly. 23 y/o Female, Ethiopian speaking, no significant PMHx a/w sepsis secondary to acute Rt pyelonephritis and co-current entero/rhinovirus infection; Found to have mild splenomegaly.

## 2022-08-30 NOTE — PROGRESS NOTE ADULT - SUBJECTIVE AND OBJECTIVE BOX
[facility] Division of Hospital Medicine  Kimberly Viramontes MD  Pager #[phone]    Patient is a 22y old  Female who presents with a chief complaint of Fever, headache, abd pain (29 Aug 2022 12:42)    SUBJECTIVE / OVERNIGHT EVENTS: Rapid response yesterday for fever to 103 with sinus tachycardia documented up to 160s. BP stable. Saturating well on RA. Given IV tylenol. Abx broadened to meropenem.  Today, using  #[id number], patient reports she felt asymptomatic at the time of rapid response yesterday, was surprised to see so many providers at her bedside. Denies feeling chest pain, palpitations. She continues to experience right flank pain which has been slightly improved from prior. No nausea, vomiting, dysuria, rash.     MEDICATIONS  (STANDING):  enoxaparin Injectable 40 milliGRAM(s) SubCutaneous every 24 hours  lactobacillus acidophilus 1 Tablet(s) Oral daily  meropenem  IVPB 1000 milliGRAM(s) IV Intermittent every 8 hours  multivitamin 1 Tablet(s) Oral daily  sodium chloride 0.9% lock flush 3 milliLiter(s) IV Push every 8 hours  sodium chloride 0.9%. 1000 milliLiter(s) (75 mL/Hr) IV Continuous <Continuous>    MEDICATIONS  (PRN):  acetaminophen     Tablet .. 650 milliGRAM(s) Oral every 6 hours PRN Mild Pain (1 - 3), Moderate Pain (4 - 6)  oxycodone    5 mG/acetaminophen 325 mG 1 Tablet(s) Oral every 8 hours PRN Severe Pain (7 - 10)    CAPILLARY BLOOD GLUCOSE    POCT Blood Glucose.: 93 mg/dL (29 Aug 2022 18:03)    I&O's Summary    29 Aug 2022 07:01  -  30 Aug 2022 07:00  --------------------------------------------------------  IN: 0 mL / OUT: 0 mL / NET: 0 mL    PHYSICAL EXAM:  Vital Signs Last 24 Hrs  T(C): 38 (30 Aug 2022 09:14), Max: 39.4 (29 Aug 2022 18:01)  T(F): 100.4 (30 Aug 2022 09:14), Max: 103 (29 Aug 2022 18:01)  HR: 101 (30 Aug 2022 09:14) (68 - 160)  BP: 101/60 (30 Aug 2022 09:14) (98/53 - 127/86)  BP(mean): --  RR: 18 (30 Aug 2022 09:14) (16 - 18)  SpO2: 99% (30 Aug 2022 09:14) (98% - 100%)    Parameters below as of 30 Aug 2022 09:14  Patient On (Oxygen Delivery Method): room air    CONSTITUTIONAL: NAD, well-developed, well-groomed  EYES: EOMI; conjunctiva and sclera clear  ENMT: Moist mucosa  RESPIRATORY: Normal respiratory effort; lungs are clear to auscultation bilaterally  CARDIOVASCULAR: Regular rate and rhythm, normal S1 and S2, No lower extremity edema; Peripheral pulses are 2+ bilaterally  ABDOMEN: Nontender to palpation, normoactive bowel sounds, no rebound/guarding, +Right CVA tenderness with less tenderness than prior exam  PSYCH: calm; affect appropriate  NEUROLOGY: moving all extremities; no gross sensory deficits   SKIN: No rashes; no palpable lesions    LABS:                        9.5    9.57  )-----------( 160      ( 30 Aug 2022 07:05 )             28.6     08-30    137  |  107  |  6<L>  ----------------------------<  96  4.3   |  19<L>  |  0.57    Ca    8.3<L>      30 Aug 2022 07:05  Phos  2.4     08-30  Mg     1.70     08-30    TPro  6.2  /  Alb  3.1<L>  /  TBili  0.5  /  DBili  x   /  AST  36<H>  /  ALT  96<H>  /  AlkPhos  124<H>  08-30    PT/INR - ( 28 Aug 2022 19:30 )   PT: 16.8 sec;   INR: 1.44 ratio         PTT - ( 28 Aug 2022 19:30 )  PTT:30.6 sec      Urinalysis Basic - ( 28 Aug 2022 19:30 )    Color: Yellow / Appearance: Slightly Turbid / S.025 / pH: x  Gluc: x / Ketone: Moderate  / Bili: Negative / Urobili: <2 mg/dL   Blood: x / Protein: 300 mg/dL / Nitrite: Positive   Leuk Esterase: Large / RBC: 3 /HPF /  /HPF   Sq Epi: x / Non Sq Epi: 4 /HPF / Bacteria: Many    Culture - Urine (collected 28 Aug 2022 19:57)  Source: Clean Catch Clean Catch (Midstream)  Preliminary Report (29 Aug 2022 23:00):    >100,000 CFU/ml Escherichia coli    Culture - Blood (collected 28 Aug 2022 19:20)  Source: .Blood Blood-Peripheral  Preliminary Report (30 Aug 2022 01:03):    No growth to date.    Culture - Blood (collected 28 Aug 2022 19:18)  Source: .Blood Blood-Peripheral  Preliminary Report (30 Aug 2022 01:03):    No growth to date.    RADIOLOGY & ADDITIONAL TESTS: Reviewed    COORDINATION OF CARE:  Care Discussed with Consultants/Other Providers [Y- Medicine ACP]   [facility] Division of Hospital Medicine  Kimberly Viramontes MD  Pager #[phone]    Patient is a 22y old  Female who presents with a chief complaint of Fever, headache, abd pain (29 Aug 2022 12:42)    SUBJECTIVE / OVERNIGHT EVENTS: Rapid response yesterday for fever to 103 with sinus tachycardia documented up to 160s. BP stable. Saturating well on RA. Given IV tylenol. Abx broadened to meropenem.  Today, using  #[id number], patient reports she felt asymptomatic at the time of rapid response yesterday, was surprised to see so many providers at her bedside. Denies feeling chest pain, palpitations. She continues to experience right flank pain which has been slightly improved from prior. No nausea, vomiting, dysuria, rash.     MEDICATIONS  (STANDING):  enoxaparin Injectable 40 milliGRAM(s) SubCutaneous every 24 hours  lactobacillus acidophilus 1 Tablet(s) Oral daily  meropenem  IVPB 1000 milliGRAM(s) IV Intermittent every 8 hours  multivitamin 1 Tablet(s) Oral daily  sodium chloride 0.9% lock flush 3 milliLiter(s) IV Push every 8 hours  sodium chloride 0.9%. 1000 milliLiter(s) (75 mL/Hr) IV Continuous <Continuous>    MEDICATIONS  (PRN):  acetaminophen     Tablet .. 650 milliGRAM(s) Oral every 6 hours PRN Mild Pain (1 - 3), Moderate Pain (4 - 6)  oxycodone    5 mG/acetaminophen 325 mG 1 Tablet(s) Oral every 8 hours PRN Severe Pain (7 - 10)    CAPILLARY BLOOD GLUCOSE    POCT Blood Glucose.: 93 mg/dL (29 Aug 2022 18:03)    I&O's Summary    29 Aug 2022 07:01  -  30 Aug 2022 07:00  --------------------------------------------------------  IN: 0 mL / OUT: 0 mL / NET: 0 mL    PHYSICAL EXAM:  Vital Signs Last 24 Hrs  T(C): 38 (30 Aug 2022 09:14), Max: 39.4 (29 Aug 2022 18:01)  T(F): 100.4 (30 Aug 2022 09:14), Max: 103 (29 Aug 2022 18:01)  HR: 101 (30 Aug 2022 09:14) (68 - 160)  BP: 101/60 (30 Aug 2022 09:14) (98/53 - 127/86)  BP(mean): --  RR: 18 (30 Aug 2022 09:14) (16 - 18)  SpO2: 99% (30 Aug 2022 09:14) (98% - 100%)    Parameters below as of 30 Aug 2022 09:14  Patient On (Oxygen Delivery Method): room air    CONSTITUTIONAL: NAD, well-developed, well-groomed  EYES: EOMI; conjunctiva and sclera clear  ENMT: Moist mucosa  RESPIRATORY: Normal respiratory effort; lungs are clear to auscultation bilaterally  CARDIOVASCULAR: Regular rate and rhythm, normal S1 and S2, No lower extremity edema; Peripheral pulses are 2+ bilaterally  ABDOMEN: Nontender to palpation, normoactive bowel sounds, no rebound/guarding, +Right CVA tenderness with less tenderness than prior exam  PSYCH: calm; affect appropriate  NEUROLOGY: moving all extremities; no gross sensory deficits   SKIN: No rashes; no palpable lesions    LABS:                        9.5    9.57  )-----------( 160      ( 30 Aug 2022 07:05 )             28.6     08-30    137  |  107  |  6<L>  ----------------------------<  96  4.3   |  19<L>  |  0.57    Ca    8.3<L>      30 Aug 2022 07:05  Phos  2.4     08-30  Mg     1.70     08-30    TPro  6.2  /  Alb  3.1<L>  /  TBili  0.5  /  DBili  x   /  AST  36<H>  /  ALT  96<H>  /  AlkPhos  124<H>  08-30    PT/INR - ( 28 Aug 2022 19:30 )   PT: 16.8 sec;   INR: 1.44 ratio         PTT - ( 28 Aug 2022 19:30 )  PTT:30.6 sec      Urinalysis Basic - ( 28 Aug 2022 19:30 )    Color: Yellow / Appearance: Slightly Turbid / S.025 / pH: x  Gluc: x / Ketone: Moderate  / Bili: Negative / Urobili: <2 mg/dL   Blood: x / Protein: 300 mg/dL / Nitrite: Positive   Leuk Esterase: Large / RBC: 3 /HPF /  /HPF   Sq Epi: x / Non Sq Epi: 4 /HPF / Bacteria: Many    Culture - Urine (collected 28 Aug 2022 19:57)  Source: Clean Catch Clean Catch (Midstream)  Preliminary Report (29 Aug 2022 23:00):    >100,000 CFU/ml Escherichia coli    Culture - Blood (collected 28 Aug 2022 19:20)  Source: .Blood Blood-Peripheral  Preliminary Report (30 Aug 2022 01:03):    No growth to date.    Culture - Blood (collected 28 Aug 2022 19:18)  Source: .Blood Blood-Peripheral  Preliminary Report (30 Aug 2022 01:03):    No growth to date.    RADIOLOGY & ADDITIONAL TESTS: Reviewed    COORDINATION OF CARE:  Care Discussed with Consultants/Other Providers [Y- Medicine ACP]   [facility] Division of Hospital Medicine  Kimberly Viramontes MD  Pager #[phone]    Patient is a 22y old  Female who presents with a chief complaint of Fever, headache, abd pain (29 Aug 2022 12:42)    SUBJECTIVE / OVERNIGHT EVENTS: Rapid response yesterday for fever to 103 with sinus tachycardia documented up to 160s. BP stable. Saturating well on RA. Given IV tylenol. Abx broadened to meropenem.  Today, using  #[id number], patient reports she felt asymptomatic at the time of rapid response yesterday, was surprised to see so many providers at her bedside. Denies feeling chest pain, palpitations. She continues to experience right flank pain which has been slightly improved from prior. No nausea, vomiting, dysuria, rash.     MEDICATIONS  (STANDING):  enoxaparin Injectable 40 milliGRAM(s) SubCutaneous every 24 hours  lactobacillus acidophilus 1 Tablet(s) Oral daily  meropenem  IVPB 1000 milliGRAM(s) IV Intermittent every 8 hours  multivitamin 1 Tablet(s) Oral daily  sodium chloride 0.9% lock flush 3 milliLiter(s) IV Push every 8 hours  sodium chloride 0.9%. 1000 milliLiter(s) (75 mL/Hr) IV Continuous <Continuous>    MEDICATIONS  (PRN):  acetaminophen     Tablet .. 650 milliGRAM(s) Oral every 6 hours PRN Mild Pain (1 - 3), Moderate Pain (4 - 6)  oxycodone    5 mG/acetaminophen 325 mG 1 Tablet(s) Oral every 8 hours PRN Severe Pain (7 - 10)    CAPILLARY BLOOD GLUCOSE    POCT Blood Glucose.: 93 mg/dL (29 Aug 2022 18:03)    I&O's Summary    29 Aug 2022 07:01  -  30 Aug 2022 07:00  --------------------------------------------------------  IN: 0 mL / OUT: 0 mL / NET: 0 mL    PHYSICAL EXAM:  Vital Signs Last 24 Hrs  T(C): 38 (30 Aug 2022 09:14), Max: 39.4 (29 Aug 2022 18:01)  T(F): 100.4 (30 Aug 2022 09:14), Max: 103 (29 Aug 2022 18:01)  HR: 101 (30 Aug 2022 09:14) (68 - 160)  BP: 101/60 (30 Aug 2022 09:14) (98/53 - 127/86)  BP(mean): --  RR: 18 (30 Aug 2022 09:14) (16 - 18)  SpO2: 99% (30 Aug 2022 09:14) (98% - 100%)    Parameters below as of 30 Aug 2022 09:14  Patient On (Oxygen Delivery Method): room air    CONSTITUTIONAL: NAD, well-developed, well-groomed  EYES: EOMI; conjunctiva and sclera clear  ENMT: Moist mucosa  RESPIRATORY: Normal respiratory effort; lungs are clear to auscultation bilaterally  CARDIOVASCULAR: Regular rate and rhythm, normal S1 and S2, No lower extremity edema; Peripheral pulses are 2+ bilaterally  ABDOMEN: Nontender to palpation, normoactive bowel sounds, no rebound/guarding, +Right CVA tenderness with less tenderness than prior exam  PSYCH: calm; affect appropriate  NEUROLOGY: moving all extremities; no gross sensory deficits   SKIN: No rashes; no palpable lesions    LABS:                        9.5    9.57  )-----------( 160      ( 30 Aug 2022 07:05 )             28.6     08-30    137  |  107  |  6<L>  ----------------------------<  96  4.3   |  19<L>  |  0.57    Ca    8.3<L>      30 Aug 2022 07:05  Phos  2.4     08-30  Mg     1.70     08-30    TPro  6.2  /  Alb  3.1<L>  /  TBili  0.5  /  DBili  x   /  AST  36<H>  /  ALT  96<H>  /  AlkPhos  124<H>  08-30    PT/INR - ( 28 Aug 2022 19:30 )   PT: 16.8 sec;   INR: 1.44 ratio         PTT - ( 28 Aug 2022 19:30 )  PTT:30.6 sec      Urinalysis Basic - ( 28 Aug 2022 19:30 )    Color: Yellow / Appearance: Slightly Turbid / S.025 / pH: x  Gluc: x / Ketone: Moderate  / Bili: Negative / Urobili: <2 mg/dL   Blood: x / Protein: 300 mg/dL / Nitrite: Positive   Leuk Esterase: Large / RBC: 3 /HPF /  /HPF   Sq Epi: x / Non Sq Epi: 4 /HPF / Bacteria: Many    Culture - Urine (collected 28 Aug 2022 19:57)  Source: Clean Catch Clean Catch (Midstream)  Preliminary Report (29 Aug 2022 23:00):    >100,000 CFU/ml Escherichia coli    Culture - Blood (collected 28 Aug 2022 19:20)  Source: .Blood Blood-Peripheral  Preliminary Report (30 Aug 2022 01:03):    No growth to date.    Culture - Blood (collected 28 Aug 2022 19:18)  Source: .Blood Blood-Peripheral  Preliminary Report (30 Aug 2022 01:03):    No growth to date.    RADIOLOGY & ADDITIONAL TESTS: Reviewed    COORDINATION OF CARE:  Care Discussed with Consultants/Other Providers [Y- Medicine ACP]

## 2022-08-30 NOTE — PROGRESS NOTE ADULT - PROBLEM SELECTOR PLAN 3
Alk phos 130->124 -> 124  AST 40 -> 82 ->36  ALT 92 -> 128 -> 96  - Acute hep panel negative. continue to trend. Possibly 2/2 viral infection

## 2022-08-30 NOTE — PROGRESS NOTE ADULT - PROBLEM SELECTOR PLAN 2
UA: (+) pyuria, (+) nitrates, jonh LE, (+) bacteruria   CT A/P c/w right pyelonephritis.  Follow up urine culture., continue w/ meropenem as above  R cva tenderness improving

## 2022-08-30 NOTE — PROVIDER CONTACT NOTE (OTHER) - ACTION/TREATMENT ORDERED:
Provider recommended tylenol, ice packs applied to body, and re assess temperature after interventions

## 2022-08-31 LAB
-  AMIKACIN: SIGNIFICANT CHANGE UP
-  AMOXICILLIN/CLAVULANIC ACID: SIGNIFICANT CHANGE UP
-  AMPICILLIN/SULBACTAM: SIGNIFICANT CHANGE UP
-  AMPICILLIN: SIGNIFICANT CHANGE UP
-  AZTREONAM: SIGNIFICANT CHANGE UP
-  CEFAZOLIN: SIGNIFICANT CHANGE UP
-  CEFEPIME: SIGNIFICANT CHANGE UP
-  CEFOXITIN: SIGNIFICANT CHANGE UP
-  CEFTRIAXONE: SIGNIFICANT CHANGE UP
-  CIPROFLOXACIN: SIGNIFICANT CHANGE UP
-  ERTAPENEM: SIGNIFICANT CHANGE UP
-  GENTAMICIN: SIGNIFICANT CHANGE UP
-  IMIPENEM: SIGNIFICANT CHANGE UP
-  LEVOFLOXACIN: SIGNIFICANT CHANGE UP
-  MEROPENEM: SIGNIFICANT CHANGE UP
-  NITROFURANTOIN: SIGNIFICANT CHANGE UP
-  PIPERACILLIN/TAZOBACTAM: SIGNIFICANT CHANGE UP
-  TIGECYCLINE: SIGNIFICANT CHANGE UP
-  TOBRAMYCIN: SIGNIFICANT CHANGE UP
-  TRIMETHOPRIM/SULFAMETHOXAZOLE: SIGNIFICANT CHANGE UP
ALBUMIN SERPL ELPH-MCNC: 3.1 G/DL — LOW (ref 3.3–5)
ALP SERPL-CCNC: 120 U/L — SIGNIFICANT CHANGE UP (ref 40–120)
ALT FLD-CCNC: 68 U/L — HIGH (ref 4–33)
ANION GAP SERPL CALC-SCNC: 12 MMOL/L — SIGNIFICANT CHANGE UP (ref 7–14)
AST SERPL-CCNC: 21 U/L — SIGNIFICANT CHANGE UP (ref 4–32)
BILIRUB SERPL-MCNC: 0.3 MG/DL — SIGNIFICANT CHANGE UP (ref 0.2–1.2)
BUN SERPL-MCNC: 6 MG/DL — LOW (ref 7–23)
C TRACH RRNA SPEC QL NAA+PROBE: SIGNIFICANT CHANGE UP
CALCIUM SERPL-MCNC: 8.3 MG/DL — LOW (ref 8.4–10.5)
CHLORIDE SERPL-SCNC: 106 MMOL/L — SIGNIFICANT CHANGE UP (ref 98–107)
CO2 SERPL-SCNC: 21 MMOL/L — LOW (ref 22–31)
CREAT SERPL-MCNC: 0.52 MG/DL — SIGNIFICANT CHANGE UP (ref 0.5–1.3)
CULTURE RESULTS: NO GROWTH — SIGNIFICANT CHANGE UP
CULTURE RESULTS: SIGNIFICANT CHANGE UP
EGFR: 135 ML/MIN/1.73M2 — SIGNIFICANT CHANGE UP
GLUCOSE SERPL-MCNC: 102 MG/DL — HIGH (ref 70–99)
HCT VFR BLD CALC: 28.9 % — LOW (ref 34.5–45)
HGB BLD-MCNC: 9.9 G/DL — LOW (ref 11.5–15.5)
MAGNESIUM SERPL-MCNC: 1.7 MG/DL — SIGNIFICANT CHANGE UP (ref 1.6–2.6)
MCHC RBC-ENTMCNC: 28.6 PG — SIGNIFICANT CHANGE UP (ref 27–34)
MCHC RBC-ENTMCNC: 34.3 GM/DL — SIGNIFICANT CHANGE UP (ref 32–36)
MCV RBC AUTO: 83.5 FL — SIGNIFICANT CHANGE UP (ref 80–100)
METHOD TYPE: SIGNIFICANT CHANGE UP
N GONORRHOEA RRNA SPEC QL NAA+PROBE: SIGNIFICANT CHANGE UP
NRBC # BLD: 0 /100 WBCS — SIGNIFICANT CHANGE UP (ref 0–0)
NRBC # FLD: 0 K/UL — SIGNIFICANT CHANGE UP (ref 0–0)
ORGANISM # SPEC MICROSCOPIC CNT: SIGNIFICANT CHANGE UP
PHOSPHATE SERPL-MCNC: 2.1 MG/DL — LOW (ref 2.5–4.5)
PLATELET # BLD AUTO: 244 K/UL — SIGNIFICANT CHANGE UP (ref 150–400)
POTASSIUM SERPL-MCNC: 3 MMOL/L — LOW (ref 3.5–5.3)
POTASSIUM SERPL-SCNC: 3 MMOL/L — LOW (ref 3.5–5.3)
PROT SERPL-MCNC: 6.5 G/DL — SIGNIFICANT CHANGE UP (ref 6–8.3)
RBC # BLD: 3.46 M/UL — LOW (ref 3.8–5.2)
RBC # FLD: 14.6 % — HIGH (ref 10.3–14.5)
SODIUM SERPL-SCNC: 139 MMOL/L — SIGNIFICANT CHANGE UP (ref 135–145)
SPECIMEN SOURCE: SIGNIFICANT CHANGE UP
WBC # BLD: 8.17 K/UL — SIGNIFICANT CHANGE UP (ref 3.8–10.5)
WBC # FLD AUTO: 8.17 K/UL — SIGNIFICANT CHANGE UP (ref 3.8–10.5)

## 2022-08-31 PROCEDURE — 99254 IP/OBS CNSLTJ NEW/EST MOD 60: CPT | Mod: GC

## 2022-08-31 PROCEDURE — 99233 SBSQ HOSP IP/OBS HIGH 50: CPT

## 2022-08-31 RX ORDER — CIPROFLOXACIN LACTATE 400MG/40ML
500 VIAL (ML) INTRAVENOUS EVERY 12 HOURS
Refills: 0 | Status: DISCONTINUED | OUTPATIENT
Start: 2022-08-31 | End: 2022-09-02

## 2022-08-31 RX ORDER — SODIUM,POTASSIUM PHOSPHATES 278-250MG
1 POWDER IN PACKET (EA) ORAL ONCE
Refills: 0 | Status: COMPLETED | OUTPATIENT
Start: 2022-08-31 | End: 2022-08-31

## 2022-08-31 RX ORDER — POTASSIUM CHLORIDE 20 MEQ
40 PACKET (EA) ORAL ONCE
Refills: 0 | Status: COMPLETED | OUTPATIENT
Start: 2022-08-31 | End: 2022-08-31

## 2022-08-31 RX ORDER — ONDANSETRON 8 MG/1
4 TABLET, FILM COATED ORAL ONCE
Refills: 0 | Status: COMPLETED | OUTPATIENT
Start: 2022-08-31 | End: 2022-08-31

## 2022-08-31 RX ORDER — MAGNESIUM SULFATE 500 MG/ML
1 VIAL (ML) INJECTION ONCE
Refills: 0 | Status: COMPLETED | OUTPATIENT
Start: 2022-08-31 | End: 2022-08-31

## 2022-08-31 RX ORDER — POTASSIUM CHLORIDE 20 MEQ
10 PACKET (EA) ORAL
Refills: 0 | Status: COMPLETED | OUTPATIENT
Start: 2022-08-31 | End: 2022-08-31

## 2022-08-31 RX ADMIN — ONDANSETRON 4 MILLIGRAM(S): 8 TABLET, FILM COATED ORAL at 10:54

## 2022-08-31 RX ADMIN — Medication 650 MILLIGRAM(S): at 05:17

## 2022-08-31 RX ADMIN — Medication 1 TABLET(S): at 11:37

## 2022-08-31 RX ADMIN — MEROPENEM 100 MILLIGRAM(S): 1 INJECTION INTRAVENOUS at 15:13

## 2022-08-31 RX ADMIN — ENOXAPARIN SODIUM 40 MILLIGRAM(S): 100 INJECTION SUBCUTANEOUS at 07:14

## 2022-08-31 RX ADMIN — Medication 40 MILLIEQUIVALENT(S): at 09:51

## 2022-08-31 RX ADMIN — Medication 100 MILLIEQUIVALENT(S): at 12:52

## 2022-08-31 RX ADMIN — Medication 1 PACKET(S): at 09:51

## 2022-08-31 RX ADMIN — Medication 1 TABLET(S): at 11:38

## 2022-08-31 RX ADMIN — Medication 100 MILLIEQUIVALENT(S): at 15:13

## 2022-08-31 RX ADMIN — Medication 100 GRAM(S): at 09:51

## 2022-08-31 RX ADMIN — Medication 100 MILLIEQUIVALENT(S): at 10:54

## 2022-08-31 RX ADMIN — Medication 650 MILLIGRAM(S): at 03:56

## 2022-08-31 RX ADMIN — Medication 500 MILLIGRAM(S): at 19:05

## 2022-08-31 RX ADMIN — MEROPENEM 100 MILLIGRAM(S): 1 INJECTION INTRAVENOUS at 07:14

## 2022-08-31 RX ADMIN — SODIUM CHLORIDE 75 MILLILITER(S): 9 INJECTION INTRAMUSCULAR; INTRAVENOUS; SUBCUTANEOUS at 07:24

## 2022-08-31 RX ADMIN — SODIUM CHLORIDE 3 MILLILITER(S): 9 INJECTION INTRAMUSCULAR; INTRAVENOUS; SUBCUTANEOUS at 15:07

## 2022-08-31 NOTE — PROGRESS NOTE ADULT - ASSESSMENT
23 y/o Female, Kyrgyz speaking, no significant PMHx a/w sepsis secondary to acute Rt pyelonephritis and co-current entero/rhinovirus infection; Found to have mild splenomegaly. 21 y/o Female, Qatari speaking, no significant PMHx a/w sepsis secondary to acute Rt pyelonephritis and co-current entero/rhinovirus infection; Found to have mild splenomegaly. 23 y/o Female, Sudanese speaking, no significant PMHx a/w sepsis secondary to acute Rt pyelonephritis and co-current entero/rhinovirus infection; Found to have mild splenomegaly.

## 2022-08-31 NOTE — CONSULT NOTE ADULT - ASSESSMENT
21 y/o Female, Belarusian speaking, no significant PMHx a/w sepsis secondary to acute Rt pyelonephritis and co-current entero/rhinovirus infection. ID consulted for persistent fevers despite broad spectrum abx.       Recommendations:     #Pyelonephritis   - spetic by HR/temp/leukocytosis on arrival   - UA/UCx c/w UTI, CT c/w pyelo   - received CTX in ED, zosyn on hosp day 1 and broadened to meropenem y/d for persistent fevers.   - given initial UCx was E. Coli suspect CTX was appropriate, as pyelo is known to cause persistent fevers despite appropriate ABX treatment, if patient continues to exhibit clinical improvement can re-narrow ABX back to CTX or pending UCx sensitivities which should be back today.    - f/u repeat Cx though suspect all related to e. coli pyelo 23 y/o Female, Malagasy speaking, no significant PMHx a/w sepsis secondary to acute Rt pyelonephritis and co-current entero/rhinovirus infection. ID consulted for persistent fevers despite broad spectrum abx.       Recommendations:     #Pyelonephritis   - spetic by HR/temp/leukocytosis on arrival   - UA/UCx c/w UTI, CT c/w pyelo   - received CTX in ED, zosyn on hosp day 1 and broadened to meropenem y/d for persistent fevers.   - given initial UCx was E. Coli suspect CTX was appropriate, as pyelo is known to cause persistent fevers despite appropriate ABX treatment, if patient continues to exhibit clinical improvement can re-narrow ABX back to CTX or pending UCx sensitivities which should be back today.    - f/u repeat Cx though suspect all related to e. coli pyelo 21 y/o Female, Jordanian speaking, no significant PMHx a/w sepsis secondary to acute Rt pyelonephritis and co-current entero/rhinovirus infection. ID consulted for persistent fevers despite broad spectrum abx.       Recommendations:     #Pyelonephritis   - spetic by HR/temp/leukocytosis on arrival   - UA/UCx c/w UTI, CT c/w pyelo   - received CTX in ED, zosyn on hosp day 1 and broadened to meropenem y/d for persistent fevers.   - given initial UCx was E. Coli suspect CTX was appropriate, as pyelo is known to cause persistent fevers despite appropriate ABX treatment, if patient continues to exhibit clinical improvement can re-narrow ABX back to CTX or pending UCx sensitivities which should be back today.    - f/u repeat Cx though suspect all related to e. coli pyelo ***THIS NOTE IS INCOMPLETE PENDING ATTENDING ATTESTATION***     23 y/o Female, Mohawk speaking, no significant PMHx a/w sepsis secondary to acute Rt pyelonephritis and co-current entero/rhinovirus infection. ID consulted for persistent fevers despite broad spectrum abx.       Recommendations:     #Pyelonephritis   - spetic by HR/temp/leukocytosis on arrival   - UA/UCx c/w UTI, CT c/w pyelo   - received CTX in ED, zosyn on hosp day 1 and broadened to meropenem y/d for persistent fevers.   - given initial UCx was E. Coli suspect CTX was appropriate, as pyelo is known to cause persistent fevers despite appropriate ABX treatment, if patient continues to exhibit clinical improvement can re-narrow ABX back to CTX or pending UCx sensitivities which should be back today.    - f/u repeat Cx though suspect all related to e. coli pyelo ***THIS NOTE IS INCOMPLETE PENDING ATTENDING ATTESTATION***     23 y/o Female, Khmer speaking, no significant PMHx a/w sepsis secondary to acute Rt pyelonephritis and co-current entero/rhinovirus infection. ID consulted for persistent fevers despite broad spectrum abx.       Recommendations:     #Pyelonephritis   - spetic by HR/temp/leukocytosis on arrival   - UA/UCx c/w UTI, CT c/w pyelo   - received CTX in ED, zosyn on hosp day 1 and broadened to meropenem y/d for persistent fevers.   - given initial UCx was E. Coli suspect CTX was appropriate, as pyelo is known to cause persistent fevers despite appropriate ABX treatment, if patient continues to exhibit clinical improvement can re-narrow ABX back to CTX or pending UCx sensitivities which should be back today.    - f/u repeat Cx though suspect all related to e. coli pyelo ***THIS NOTE IS INCOMPLETE PENDING ATTENDING ATTESTATION***     21 y/o Female, Albanian speaking, no significant PMHx a/w sepsis secondary to acute Rt pyelonephritis and co-current entero/rhinovirus infection. ID consulted for persistent fevers despite broad spectrum abx.       Recommendations:     #Pyelonephritis   - spetic by HR/temp/leukocytosis on arrival   - UA/UCx c/w UTI, CT c/w pyelo   - received CTX in ED, zosyn on hosp day 1 and broadened to meropenem y/d for persistent fevers.   - given initial UCx was E. Coli suspect CTX was appropriate, as pyelo is known to cause persistent fevers despite appropriate ABX treatment, if patient continues to exhibit clinical improvement can re-narrow ABX back to CTX or pending UCx sensitivities which should be back today.    - f/u repeat Cx though suspect all related to e. coli pyelo 23 y/o Female, South Sudanese speaking, no significant PMHx a/w sepsis secondary to acute Rt pyelonephritis and co-current entero/rhinovirus infection. ID consulted for persistent fevers despite broad spectrum abx.       Recommendations:     #Pyelonephritis   - spetic by HR/temp/leukocytosis on arrival   - UA/UCx c/w UTI, CT c/w pyelo   - received CTX in ED, zosyn on hosp day 1 and broadened to meropenem y/d for persistent fevers.   - given initial UCx was E. Coli suspect CTX was appropriate, as pyelo is known to cause persistent fevers despite appropriate ABX treatment, if patient continues to exhibit clinical improvement can re-narrow ABX back to CTX or pending UCx sensitivities which should be back today.    - f/u repeat Cx though suspect all related to e. coli pyelo   - would likely recommend narrowing back to CTX but okay to wait for UCx sens to return later today   - if patient continues to clinically improve and afebrile >24 hours can transition to PO abx (cipro vs keflex or cefpodoxime pending ucx sensitivities) 21 y/o Female, Congolese speaking, no significant PMHx a/w sepsis secondary to acute Rt pyelonephritis and co-current entero/rhinovirus infection. ID consulted for persistent fevers despite broad spectrum abx.       Recommendations:     #Pyelonephritis   - spetic by HR/temp/leukocytosis on arrival   - UA/UCx c/w UTI, CT c/w pyelo   - received CTX in ED, zosyn on hosp day 1 and broadened to meropenem y/d for persistent fevers.   - given initial UCx was E. Coli suspect CTX was appropriate, as pyelo is known to cause persistent fevers despite appropriate ABX treatment, if patient continues to exhibit clinical improvement can re-narrow ABX back to CTX or pending UCx sensitivities which should be back today.    - f/u repeat Cx though suspect all related to e. coli pyelo   - would likely recommend narrowing back to CTX but okay to wait for UCx sens to return later today   - if patient continues to clinically improve and afebrile >24 hours can transition to PO abx (cipro vs keflex or cefpodoxime pending ucx sensitivities) 21 y/o Female, South Sudanese speaking, no significant PMHx a/w sepsis secondary to acute Rt pyelonephritis and co-current entero/rhinovirus infection. ID consulted for persistent fevers despite broad spectrum abx.       Recommendations:     #Pyelonephritis   - spetic by HR/temp/leukocytosis on arrival   - UA/UCx c/w UTI, CT c/w pyelo   - received CTX in ED, zosyn on hosp day 1 and broadened to meropenem y/d for persistent fevers.   - given initial UCx was E. Coli suspect CTX was appropriate, as pyelo is known to cause persistent fevers despite appropriate ABX treatment, if patient continues to exhibit clinical improvement can re-narrow ABX back to CTX or pending UCx sensitivities which should be back today.    - f/u repeat Cx though suspect all related to e. coli pyelo   - would likely recommend narrowing back to CTX but okay to wait for UCx sens to return later today   - if patient continues to clinically improve and afebrile >24 hours can transition to PO abx (cipro vs keflex or cefpodoxime pending ucx sensitivities)

## 2022-08-31 NOTE — PROGRESS NOTE ADULT - PROBLEM SELECTOR PLAN 3
Improving    Alk phos 130->124 -> 124 -> 120  AST 40 -> 82 ->36 -> 21  ALT 92 -> 128 -> 96 ->68  - Acute hep panel negative. continue to trend. Possibly 2/2 viral infection

## 2022-08-31 NOTE — PROGRESS NOTE ADULT - PROBLEM SELECTOR PLAN 1
secondary to pyelonephritis and entero/rhinovirus infection. Svch=939.2, Leukocytosis 12.3K; Pt toxic appearing with severe diaphoresis and recurrent fevers. Lactate normal.    - RRT 8/29 for T 103 and HR up to 160s  - Broadened to meropenem on 8/29  - UCx with >100k ecoli, f/u sensitivities. Prelim bcx neg from initial set and repeat blood cultures sent 8/29 also prelim neg  - 102.4 fever yesterday evening  - Appreciate ID consult, follow up urine culture, will plan to de-escalate pending culture results  - c/w NS 75cc/hr, no signs of volume overload  - c/w supportive care for entero/rhinovirus  - HIV negative  - f/u GC, although lower suspicion for causing acute presentation secondary to pyelonephritis and entero/rhinovirus infection. Xmlg=392.2, Leukocytosis 12.3K; Pt toxic appearing with severe diaphoresis and recurrent fevers. Lactate normal.    - RRT 8/29 for T 103 and HR up to 160s  - Broadened to meropenem on 8/29  - UCx with >100k ecoli, f/u sensitivities. Prelim bcx neg from initial set and repeat blood cultures sent 8/29 also prelim neg  - 102.4 fever yesterday evening  - Appreciate ID consult, follow up urine culture, will plan to de-escalate pending culture results  - c/w NS 75cc/hr, no signs of volume overload  - c/w supportive care for entero/rhinovirus  - HIV negative  - f/u GC, although lower suspicion for causing acute presentation secondary to pyelonephritis and entero/rhinovirus infection. Qwal=655.2, Leukocytosis 12.3K; Pt toxic appearing with severe diaphoresis and recurrent fevers. Lactate normal.    - RRT 8/29 for T 103 and HR up to 160s  - Broadened to meropenem on 8/29  - UCx with >100k ecoli, f/u sensitivities. Prelim bcx neg from initial set and repeat blood cultures sent 8/29 also prelim neg  - 102.4 fever yesterday evening  - Appreciate ID consult, follow up urine culture, will plan to de-escalate pending culture results  - c/w NS 75cc/hr, no signs of volume overload  - c/w supportive care for entero/rhinovirus  - HIV negative  - f/u GC, although lower suspicion for causing acute presentation

## 2022-08-31 NOTE — PROGRESS NOTE ADULT - SUBJECTIVE AND OBJECTIVE BOX
Patient is a 22y old  Female who presents with a chief complaint of Fever, headache, abd pain (31 Aug 2022 10:03)    SUBJECTIVE / OVERNIGHT EVENTS: Febrile to 102.4 yesterday evening. Right flank pain improving, still present but minimal. Mild intermittent cough, no shortness of breath, nausea, chest pain.  #[id number]    MEDICATIONS  (STANDING):  enoxaparin Injectable 40 milliGRAM(s) SubCutaneous every 24 hours  lactobacillus acidophilus 1 Tablet(s) Oral daily  meropenem  IVPB 1000 milliGRAM(s) IV Intermittent every 8 hours  multivitamin 1 Tablet(s) Oral daily  potassium chloride  10 mEq/100 mL IVPB 10 milliEquivalent(s) IV Intermittent every 1 hour  sodium chloride 0.9% lock flush 3 milliLiter(s) IV Push every 8 hours  sodium chloride 0.9%. 1000 milliLiter(s) (75 mL/Hr) IV Continuous <Continuous>    MEDICATIONS  (PRN):  acetaminophen     Tablet .. 650 milliGRAM(s) Oral every 6 hours PRN Mild Pain (1 - 3), Moderate Pain (4 - 6)  oxycodone    5 mG/acetaminophen 325 mG 1 Tablet(s) Oral every 8 hours PRN Severe Pain (7 - 10)    CAPILLARY BLOOD GLUCOSE    I&O's Summary    PHYSICAL EXAM:  Vital Signs Last 24 Hrs  T(C): 36.6 (31 Aug 2022 10:00), Max: 39.1 (30 Aug 2022 17:50)  T(F): 97.8 (31 Aug 2022 10:00), Max: 102.4 (30 Aug 2022 17:50)  HR: 72 (31 Aug 2022 10:00) (64 - 98)  BP: 106/70 (31 Aug 2022 10:00) (97/57 - 106/70)  BP(mean): --  RR: 18 (31 Aug 2022 10:00) (16 - 18)  SpO2: 95% (31 Aug 2022 10:00) (95% - 100%)    Parameters below as of 31 Aug 2022 10:00  Patient On (Oxygen Delivery Method): room air    CONSTITUTIONAL: NAD, well-developed, well-groomed  EYES: EOMI; conjunctiva and sclera clear  ENMT: Moist mucosa  RESPIRATORY: Normal respiratory effort; lungs are clear to auscultation bilaterally  CARDIOVASCULAR: Regular rate and rhythm, normal S1 and S2, No lower extremity edema; Peripheral pulses are 2+ bilaterally  ABDOMEN: Nontender to palpation, normoactive bowel sounds, no rebound/guarding, +minimal right CVA tenderness  PSYCH: calm; affect appropriate  NEUROLOGY: moving all extremities; no gross sensory deficits   SKIN: No rashes; no palpable lesions    LABS:                        9.9    8.17  )-----------( 244      ( 31 Aug 2022 06:35 )             28.9     08-31    139  |  106  |  6<L>  ----------------------------<  102<H>  3.0<L>   |  21<L>  |  0.52    Ca    8.3<L>      31 Aug 2022 06:35  Phos  2.1     08-31  Mg     1.70     08-31    TPro  6.5  /  Alb  3.1<L>  /  TBili  0.3  /  DBili  x   /  AST  21  /  ALT  68<H>  /  AlkPhos  120  08-31      Culture - Urine (collected 29 Aug 2022 18:56)  Source: Clean Catch Clean Catch (Midstream)  Final Report (31 Aug 2022 00:37):    No growth    Culture - Blood (collected 29 Aug 2022 18:54)  Source: .Blood Blood-Peripheral  Preliminary Report (30 Aug 2022 23:02):    No growth to date.    Culture - Blood (collected 29 Aug 2022 18:54)  Source: .Blood Blood-Peripheral  Preliminary Report (30 Aug 2022 23:02):    No growth to date.    Culture - Blood (collected 29 Aug 2022 18:49)  Source: .Blood Blood-Peripheral  Preliminary Report (30 Aug 2022 23:02):    No growth to date.    Culture - Blood (collected 29 Aug 2022 18:49)  Source: .Blood Blood-Peripheral  Preliminary Report (30 Aug 2022 23:02):    No growth to date.    Culture - Urine (collected 28 Aug 2022 19:57)  Source: Clean Catch Clean Catch (Midstream)  Preliminary Report (29 Aug 2022 23:00):    >100,000 CFU/ml Escherichia coli    Culture - Blood (collected 28 Aug 2022 19:20)  Source: .Blood Blood-Peripheral  Preliminary Report (30 Aug 2022 01:03):    No growth to date.    Culture - Blood (collected 28 Aug 2022 19:18)  Source: .Blood Blood-Peripheral  Preliminary Report (30 Aug 2022 01:03):    No growth to date.    RADIOLOGY & ADDITIONAL TESTS: Reviewed    COORDINATION OF CARE:  Care Discussed with Consultants/Other Providers [Y- ID- antibiotic/treatment plan, medicine ACP] Patient is a 22y old  Female who presents with a chief complaint of Fever, headache, abd pain (31 Aug 2022 10:03)    SUBJECTIVE / OVERNIGHT EVENTS: Febrile to 102.4 yesterday evening. Right flank pain improving, still present but minimal. Mild intermittent cough, no shortness of breath, nausea, chest pain.  #[id number]    MEDICATIONS  (STANDING):  enoxaparin Injectable 40 milliGRAM(s) SubCutaneous every 24 hours  lactobacillus acidophilus 1 Tablet(s) Oral daily  meropenem  IVPB 1000 milliGRAM(s) IV Intermittent every 8 hours  multivitamin 1 Tablet(s) Oral daily  potassium chloride  10 mEq/100 mL IVPB 10 milliEquivalent(s) IV Intermittent every 1 hour  sodium chloride 0.9% lock flush 3 milliLiter(s) IV Push every 8 hours  sodium chloride 0.9%. 1000 milliLiter(s) (75 mL/Hr) IV Continuous <Continuous>    MEDICATIONS  (PRN):  acetaminophen     Tablet .. 650 milliGRAM(s) Oral every 6 hours PRN Mild Pain (1 - 3), Moderate Pain (4 - 6)  oxycodone    5 mG/acetaminophen 325 mG 1 Tablet(s) Oral every 8 hours PRN Severe Pain (7 - 10)    CAPILLARY BLOOD GLUCOSE    I&O's Summary    PHYSICAL EXAM:  Vital Signs Last 24 Hrs  T(C): 36.6 (31 Aug 2022 10:00), Max: 39.1 (30 Aug 2022 17:50)  T(F): 97.8 (31 Aug 2022 10:00), Max: 102.4 (30 Aug 2022 17:50)  HR: 72 (31 Aug 2022 10:00) (64 - 98)  BP: 106/70 (31 Aug 2022 10:00) (97/57 - 106/70)  BP(mean): --  RR: 18 (31 Aug 2022 10:00) (16 - 18)  SpO2: 95% (31 Aug 2022 10:00) (95% - 100%)    Parameters below as of 31 Aug 2022 10:00  Patient On (Oxygen Delivery Method): room air    CONSTITUTIONAL: NAD, well-developed, well-groomed  EYES: EOMI; conjunctiva and sclera clear  ENMT: Moist mucosa  RESPIRATORY: Normal respiratory effort; lungs are clear to auscultation bilaterally  CARDIOVASCULAR: Regular rate and rhythm, normal S1 and S2, No lower extremity edema; Peripheral pulses are 2+ bilaterally  ABDOMEN: Nontender to palpation, normoactive bowel sounds, no rebound/guarding, +minimal right CVA tenderness  PSYCH: calm; affect appropriate  NEUROLOGY: moving all extremities; no gross sensory deficits   SKIN: No rashes; no palpable lesions    LABS:                        9.9    8.17  )-----------( 244      ( 31 Aug 2022 06:35 )             28.9     08-31    139  |  106  |  6<L>  ----------------------------<  102<H>  3.0<L>   |  21<L>  |  0.52    Ca    8.3<L>      31 Aug 2022 06:35  Phos  2.1     08-31  Mg     1.70     08-31    TPro  6.5  /  Alb  3.1<L>  /  TBili  0.3  /  DBili  x   /  AST  21  /  ALT  68<H>  /  AlkPhos  120  08-31      Culture - Urine (collected 29 Aug 2022 18:56)  Source: Clean Catch Clean Catch (Midstream)  Final Report (31 Aug 2022 00:37):    No growth    Culture - Blood (collected 29 Aug 2022 18:54)  Source: .Blood Blood-Peripheral  Preliminary Report (30 Aug 2022 23:02):    No growth to date.    Culture - Blood (collected 29 Aug 2022 18:54)  Source: .Blood Blood-Peripheral  Preliminary Report (30 Aug 2022 23:02):    No growth to date.    Culture - Blood (collected 29 Aug 2022 18:49)  Source: .Blood Blood-Peripheral  Preliminary Report (30 Aug 2022 23:02):    No growth to date.    Culture - Blood (collected 29 Aug 2022 18:49)  Source: .Blood Blood-Peripheral  Preliminary Report (30 Aug 2022 23:02):    No growth to date.    Culture - Urine (collected 28 Aug 2022 19:57)  Source: Clean Catch Clean Catch (Midstream)  Preliminary Report (29 Aug 2022 23:00):    >100,000 CFU/ml Escherichia coli    Culture - Blood (collected 28 Aug 2022 19:20)  Source: .Blood Blood-Peripheral  Preliminary Report (30 Aug 2022 01:03):    No growth to date.    Culture - Blood (collected 28 Aug 2022 19:18)  Source: .Blood Blood-Peripheral  Preliminary Report (30 Aug 2022 01:03):    No growth to date.    RADIOLOGY & ADDITIONAL TESTS: Reviewed    COORDINATION OF CARE:  Care Discussed with Consultants/Other Providers [Y- ID- antibiotic/treatment plan, medicine ACP] Patient is a 22y old  Female who presents with a chief complaint of Fever, headache, abd pain (31 Aug 2022 10:03)    SUBJECTIVE / OVERNIGHT EVENTS: Febrile to 102.4 yesterday evening. Right flank pain improving, still present but minimal. Mild intermittent cough, no shortness of breath, nausea, chest pain.  #[id number]    MEDICATIONS  (STANDING):  enoxaparin Injectable 40 milliGRAM(s) SubCutaneous every 24 hours  lactobacillus acidophilus 1 Tablet(s) Oral daily  meropenem  IVPB 1000 milliGRAM(s) IV Intermittent every 8 hours  multivitamin 1 Tablet(s) Oral daily  potassium chloride  10 mEq/100 mL IVPB 10 milliEquivalent(s) IV Intermittent every 1 hour  sodium chloride 0.9% lock flush 3 milliLiter(s) IV Push every 8 hours  sodium chloride 0.9%. 1000 milliLiter(s) (75 mL/Hr) IV Continuous <Continuous>    MEDICATIONS  (PRN):  acetaminophen     Tablet .. 650 milliGRAM(s) Oral every 6 hours PRN Mild Pain (1 - 3), Moderate Pain (4 - 6)  oxycodone    5 mG/acetaminophen 325 mG 1 Tablet(s) Oral every 8 hours PRN Severe Pain (7 - 10)    CAPILLARY BLOOD GLUCOSE    I&O's Summary    PHYSICAL EXAM:  Vital Signs Last 24 Hrs  T(C): 36.6 (31 Aug 2022 10:00), Max: 39.1 (30 Aug 2022 17:50)  T(F): 97.8 (31 Aug 2022 10:00), Max: 102.4 (30 Aug 2022 17:50)  HR: 72 (31 Aug 2022 10:00) (64 - 98)  BP: 106/70 (31 Aug 2022 10:00) (97/57 - 106/70)  BP(mean): --  RR: 18 (31 Aug 2022 10:00) (16 - 18)  SpO2: 95% (31 Aug 2022 10:00) (95% - 100%)    Parameters below as of 31 Aug 2022 10:00  Patient On (Oxygen Delivery Method): room air    CONSTITUTIONAL: NAD, well-developed, well-groomed  EYES: EOMI; conjunctiva and sclera clear  ENMT: Moist mucosa  RESPIRATORY: Normal respiratory effort; lungs are clear to auscultation bilaterally  CARDIOVASCULAR: Regular rate and rhythm, normal S1 and S2, No lower extremity edema; Peripheral pulses are 2+ bilaterally  ABDOMEN: Nontender to palpation, normoactive bowel sounds, no rebound/guarding, +minimal right CVA tenderness  PSYCH: calm; affect appropriate  NEUROLOGY: moving all extremities; no gross sensory deficits   SKIN: No rashes; no palpable lesions    LABS:                        9.9    8.17  )-----------( 244      ( 31 Aug 2022 06:35 )             28.9     08-31    139  |  106  |  6<L>  ----------------------------<  102<H>  3.0<L>   |  21<L>  |  0.52    Ca    8.3<L>      31 Aug 2022 06:35  Phos  2.1     08-31  Mg     1.70     08-31    TPro  6.5  /  Alb  3.1<L>  /  TBili  0.3  /  DBili  x   /  AST  21  /  ALT  68<H>  /  AlkPhos  120  08-31      Culture - Urine (collected 29 Aug 2022 18:56)  Source: Clean Catch Clean Catch (Midstream)  Final Report (31 Aug 2022 00:37):    No growth    Culture - Blood (collected 29 Aug 2022 18:54)  Source: .Blood Blood-Peripheral  Preliminary Report (30 Aug 2022 23:02):    No growth to date.    Culture - Blood (collected 29 Aug 2022 18:54)  Source: .Blood Blood-Peripheral  Preliminary Report (30 Aug 2022 23:02):    No growth to date.    Culture - Blood (collected 29 Aug 2022 18:49)  Source: .Blood Blood-Peripheral  Preliminary Report (30 Aug 2022 23:02):    No growth to date.    Culture - Blood (collected 29 Aug 2022 18:49)  Source: .Blood Blood-Peripheral  Preliminary Report (30 Aug 2022 23:02):    No growth to date.    Culture - Urine (collected 28 Aug 2022 19:57)  Source: Clean Catch Clean Catch (Midstream)  Preliminary Report (29 Aug 2022 23:00):    >100,000 CFU/ml Escherichia coli    Culture - Blood (collected 28 Aug 2022 19:20)  Source: .Blood Blood-Peripheral  Preliminary Report (30 Aug 2022 01:03):    No growth to date.    Culture - Blood (collected 28 Aug 2022 19:18)  Source: .Blood Blood-Peripheral  Preliminary Report (30 Aug 2022 01:03):    No growth to date.    RADIOLOGY & ADDITIONAL TESTS: Reviewed    COORDINATION OF CARE:  Care Discussed with Consultants/Other Providers [Y- ID- antibiotic/treatment plan, medicine ACP]

## 2022-08-31 NOTE — CONSULT NOTE ADULT - SUBJECTIVE AND OBJECTIVE BOX
Consultation Requested by:    Patient is a 22y old  Female who presents with a chief complaint of Fever, headache, abd pain (30 Aug 2022 14:44)    HPI:  23 y/o Female, Mohawk speaking, no significant PMHx presents to the ED complaining of abdominal pain, nausea, vomiting for three days prior to arrival. Patient states that pain, 7/10, started in her upper abdomen and Rt lower chest area. She describes as sharp and radiating to her right flank area. Patient states pain is worse when standing up. Patient endorses multiple episodes of non-bilious vomiting. Patient also endorses non-bloody diarrhea also. Patient denies dysuria and increased urinary frequency. Patient states pain is worse when she takes in a deep breath. Patient endorses fever, chills and malaise at home. Patient also endorses dyspnea on exertion. Patient denies recent travel, sick contacts, cough. Patient denies oral contraceptive use but does endorse having contraceptive implant in left arm.    In ED UA was found to be positive for infection. CTAP w/IV contrast showed right pyelonephritis. Labs were significant for leukocytosis of 12.23, bandemia of 11.5, D-dimer of 683 and transaminitis. Patient was also found to be Enterovirus positive. (29 Aug 2022 03:50)       Review of Systems:  · General Symptoms	fever; chills; malaise  · Negative Skin Symptoms	no rash; no itching  · Negative Ophthalmologic Symptoms	no blurred vision L; no blurred vision R  · Negative ENMT Symptoms	no vertigo; no nasal congestion  · Negative Respiratory and Thorax Symptoms	no dyspnea; no cough  · Negative Cardiovascular Symptoms	no chest pain; no palpitations  · Cardiovascular Symptoms	dyspnea on exertion  · Gastrointestinal Symptoms	nausea; vomiting; diarrhea; abdominal pain  · Negative General Genitourinary Symptoms	no flank pain L; no dysuria; normal urinary frequency  · General Genitourinary Symptoms	flank pain R  · Negative Musculoskeletal Symptoms	no arthralgia; no arthritis  · Negative Neurological Symptoms	no syncope; no vertigo  · Negative Psychiatric Symptoms	no depression; no anxiety  · Negative Hematology Symptoms	no gum bleeding; no nose bleeding  · Negative Lymphatic Symptoms	no enlarged lymph nodes; no tender lymph nodes  · Negative Endocrine Symptoms	no cold intolerance; no heat intolerance  · Allergic/Immunologic	negative    Recent Ill Contacts:	[] No	[] Yes:  Recent Travel History:	[] No	[] Yes:  Recent Animal/Insect Exposure/Tick Bites:	[] No	[] Yes:    Allergies    No Known Allergies    Intolerances      Antimicrobials:  meropenem  IVPB 1000 milliGRAM(s) IV Intermittent every 8 hours      Other Medications:  acetaminophen     Tablet .. 650 milliGRAM(s) Oral every 6 hours PRN  enoxaparin Injectable 40 milliGRAM(s) SubCutaneous every 24 hours  lactobacillus acidophilus 1 Tablet(s) Oral daily  multivitamin 1 Tablet(s) Oral daily  oxycodone    5 mG/acetaminophen 325 mG 1 Tablet(s) Oral every 8 hours PRN  potassium chloride  10 mEq/100 mL IVPB 10 milliEquivalent(s) IV Intermittent every 1 hour  sodium chloride 0.9% lock flush 3 milliLiter(s) IV Push every 8 hours  sodium chloride 0.9%. 1000 milliLiter(s) IV Continuous <Continuous>      FAMILY HISTORY:  No pertinent family history in first degree relatives    PAST MEDICAL & SURGICAL HISTORY:  No pertinent past medical history  No significant past surgical history      SOCIAL HISTORY:    IMMUNIZATIONS  [] Up to Date		[] Not Up to Date:  Recent Immunizations:	[] No	[] Yes:    Daily     Daily   Head Circumference:  Vital Signs Last 24 Hrs  T(C): 36.8 (31 Aug 2022 07:15), Max: 39.1 (30 Aug 2022 17:50)  T(F): 98.3 (31 Aug 2022 07:15), Max: 102.4 (30 Aug 2022 17:50)  HR: 64 (31 Aug 2022 07:15) (64 - 98)  BP: 98/59 (31 Aug 2022 07:15) (97/57 - 105/62)  BP(mean): --  RR: 18 (31 Aug 2022 07:15) (16 - 18)  SpO2: 100% (31 Aug 2022 07:15) (98% - 100%)    Parameters below as of 31 Aug 2022 07:15  Patient On (Oxygen Delivery Method): room air      PHYSICAL EXAM:  GENERAL: Sitting comfortable in bed, in no acute distress  HENMT: Atraumatic, moist mucous membranes, no oropharyngeal exudates or vesicles, uvula is midline EYES: Clear bilaterally, PERRL, EOMs intact b/l  HEART: RRR, S1/S2, no murmur/gallops/rubs  RESPIRATORY: Clear to auscultation bilaterally, no wheezes/rhonchi/rales  ABDOMEN: +BS, soft, nontender, nondistended  EXTREMITIES: No lower extremity edema, +2 radial pulses b/l  NEURO:  A&Ox4, no focal motor deficits or sensory deficits   Heme/LYMPH: No ecchymosis or bruising, no anterior/posterior cervical or supraclavicular LAD  SKIN:  Skin normal color for race, warm, dry and intact. No evidence of rash.      Respiratory Support:		[x] No	[] Yes:  Vasoactive medication infusion:	[x] No	[] Yes:  Venous catheters:		[x] No	[] Yes:  Bladder catheter:		[x] No	[] Yes:    Lab Results:                        9.9    8.17  )-----------( 244      ( 31 Aug 2022 06:35 )             28.9     08-31    139  |  106  |  6<L>  ----------------------------<  102<H>  3.0<L>   |  21<L>  |  0.52    Ca    8.3<L>      31 Aug 2022 06:35  Phos  2.1     08-31  Mg     1.70     08-31    TPro  6.5  /  Alb  3.1<L>  /  TBili  0.3  /  DBili  x   /  AST  21  /  ALT  68<H>  /  AlkPhos  120  08-31    LIVER FUNCTIONS - ( 31 Aug 2022 06:35 )  Alb: 3.1 g/dL / Pro: 6.5 g/dL / ALK PHOS: 120 U/L / ALT: 68 U/L / AST: 21 U/L / GGT: x                 MICROBIOLOGY    [] Pathology slides reviewed and/or discussed with pathologist  [] Microbiology findings discussed with microbiologist or slides reviewed  [] Images erviewed with radiologist  [] Case discussed with an attending physician in addition to the patient's primary physician  [] Records, reports from outside [facility] reviewed    [] Patient requires continued monitoring for:  [] Total critical care time spent by attending physician: __ minutes, excluding procedure time. Consultation Requested by:    Patient is a 22y old  Female who presents with a chief complaint of Fever, headache, abd pain (30 Aug 2022 14:44)    HPI:  23 y/o Female, Serbian speaking, no significant PMHx presents to the ED complaining of abdominal pain, nausea, vomiting for three days prior to arrival. Patient states that pain, 7/10, started in her upper abdomen and Rt lower chest area. She describes as sharp and radiating to her right flank area. Patient states pain is worse when standing up. Patient endorses multiple episodes of non-bilious vomiting. Patient also endorses non-bloody diarrhea also. Patient denies dysuria and increased urinary frequency. Patient states pain is worse when she takes in a deep breath. Patient endorses fever, chills and malaise at home. Patient also endorses dyspnea on exertion. Patient denies recent travel, sick contacts, cough. Patient denies oral contraceptive use but does endorse having contraceptive implant in left arm.    In ED UA was found to be positive for infection. CTAP w/IV contrast showed right pyelonephritis. Labs were significant for leukocytosis of 12.23, bandemia of 11.5, D-dimer of 683 and transaminitis. Patient was also found to be Enterovirus positive. (29 Aug 2022 03:50)       Review of Systems:  · General Symptoms	fever; chills; malaise  · Negative Skin Symptoms	no rash; no itching  · Negative Ophthalmologic Symptoms	no blurred vision L; no blurred vision R  · Negative ENMT Symptoms	no vertigo; no nasal congestion  · Negative Respiratory and Thorax Symptoms	no dyspnea; no cough  · Negative Cardiovascular Symptoms	no chest pain; no palpitations  · Cardiovascular Symptoms	dyspnea on exertion  · Gastrointestinal Symptoms	nausea; vomiting; diarrhea; abdominal pain  · Negative General Genitourinary Symptoms	no flank pain L; no dysuria; normal urinary frequency  · General Genitourinary Symptoms	flank pain R  · Negative Musculoskeletal Symptoms	no arthralgia; no arthritis  · Negative Neurological Symptoms	no syncope; no vertigo  · Negative Psychiatric Symptoms	no depression; no anxiety  · Negative Hematology Symptoms	no gum bleeding; no nose bleeding  · Negative Lymphatic Symptoms	no enlarged lymph nodes; no tender lymph nodes  · Negative Endocrine Symptoms	no cold intolerance; no heat intolerance  · Allergic/Immunologic	negative    Recent Ill Contacts:	[] No	[] Yes:  Recent Travel History:	[] No	[] Yes:  Recent Animal/Insect Exposure/Tick Bites:	[] No	[] Yes:    Allergies    No Known Allergies    Intolerances      Antimicrobials:  meropenem  IVPB 1000 milliGRAM(s) IV Intermittent every 8 hours      Other Medications:  acetaminophen     Tablet .. 650 milliGRAM(s) Oral every 6 hours PRN  enoxaparin Injectable 40 milliGRAM(s) SubCutaneous every 24 hours  lactobacillus acidophilus 1 Tablet(s) Oral daily  multivitamin 1 Tablet(s) Oral daily  oxycodone    5 mG/acetaminophen 325 mG 1 Tablet(s) Oral every 8 hours PRN  potassium chloride  10 mEq/100 mL IVPB 10 milliEquivalent(s) IV Intermittent every 1 hour  sodium chloride 0.9% lock flush 3 milliLiter(s) IV Push every 8 hours  sodium chloride 0.9%. 1000 milliLiter(s) IV Continuous <Continuous>      FAMILY HISTORY:  No pertinent family history in first degree relatives    PAST MEDICAL & SURGICAL HISTORY:  No pertinent past medical history  No significant past surgical history      SOCIAL HISTORY:    IMMUNIZATIONS  [] Up to Date		[] Not Up to Date:  Recent Immunizations:	[] No	[] Yes:    Daily     Daily   Head Circumference:  Vital Signs Last 24 Hrs  T(C): 36.8 (31 Aug 2022 07:15), Max: 39.1 (30 Aug 2022 17:50)  T(F): 98.3 (31 Aug 2022 07:15), Max: 102.4 (30 Aug 2022 17:50)  HR: 64 (31 Aug 2022 07:15) (64 - 98)  BP: 98/59 (31 Aug 2022 07:15) (97/57 - 105/62)  BP(mean): --  RR: 18 (31 Aug 2022 07:15) (16 - 18)  SpO2: 100% (31 Aug 2022 07:15) (98% - 100%)    Parameters below as of 31 Aug 2022 07:15  Patient On (Oxygen Delivery Method): room air      PHYSICAL EXAM:  GENERAL: Sitting comfortable in bed, in no acute distress  HENMT: Atraumatic, moist mucous membranes, no oropharyngeal exudates or vesicles, uvula is midline EYES: Clear bilaterally, PERRL, EOMs intact b/l  HEART: RRR, S1/S2, no murmur/gallops/rubs  RESPIRATORY: Clear to auscultation bilaterally, no wheezes/rhonchi/rales  ABDOMEN: +BS, soft, nontender, nondistended  EXTREMITIES: No lower extremity edema, +2 radial pulses b/l  NEURO:  A&Ox4, no focal motor deficits or sensory deficits   Heme/LYMPH: No ecchymosis or bruising, no anterior/posterior cervical or supraclavicular LAD  SKIN:  Skin normal color for race, warm, dry and intact. No evidence of rash.      Respiratory Support:		[x] No	[] Yes:  Vasoactive medication infusion:	[x] No	[] Yes:  Venous catheters:		[x] No	[] Yes:  Bladder catheter:		[x] No	[] Yes:    Lab Results:                        9.9    8.17  )-----------( 244      ( 31 Aug 2022 06:35 )             28.9     08-31    139  |  106  |  6<L>  ----------------------------<  102<H>  3.0<L>   |  21<L>  |  0.52    Ca    8.3<L>      31 Aug 2022 06:35  Phos  2.1     08-31  Mg     1.70     08-31    TPro  6.5  /  Alb  3.1<L>  /  TBili  0.3  /  DBili  x   /  AST  21  /  ALT  68<H>  /  AlkPhos  120  08-31    LIVER FUNCTIONS - ( 31 Aug 2022 06:35 )  Alb: 3.1 g/dL / Pro: 6.5 g/dL / ALK PHOS: 120 U/L / ALT: 68 U/L / AST: 21 U/L / GGT: x                 MICROBIOLOGY    [] Pathology slides reviewed and/or discussed with pathologist  [] Microbiology findings discussed with microbiologist or slides reviewed  [] Images erviewed with radiologist  [] Case discussed with an attending physician in addition to the patient's primary physician  [] Records, reports from outside [facility] reviewed    [] Patient requires continued monitoring for:  [] Total critical care time spent by attending physician: __ minutes, excluding procedure time. Consultation Requested by:    Patient is a 22y old  Female who presents with a chief complaint of Fever, headache, abd pain (30 Aug 2022 14:44)    HPI:  21 y/o Female, Yakut speaking, no significant PMHx presents to the ED complaining of abdominal pain, nausea, vomiting for three days prior to arrival. Patient states that pain, 7/10, started in her upper abdomen and Rt lower chest area. She describes as sharp and radiating to her right flank area. Patient states pain is worse when standing up. Patient endorses multiple episodes of non-bilious vomiting. Patient also endorses non-bloody diarrhea also. Patient denies dysuria and increased urinary frequency. Patient states pain is worse when she takes in a deep breath. Patient endorses fever, chills and malaise at home. Patient also endorses dyspnea on exertion. Patient denies recent travel, sick contacts, cough. Patient denies oral contraceptive use but does endorse having contraceptive implant in left arm.    In ED UA was found to be positive for infection. CTAP w/IV contrast showed right pyelonephritis. Labs were significant for leukocytosis of 12.23, bandemia of 11.5, D-dimer of 683 and transaminitis. Patient was also found to be Enterovirus positive. (29 Aug 2022 03:50)       Review of Systems:  · General Symptoms	fever; chills; malaise  · Negative Skin Symptoms	no rash; no itching  · Negative Ophthalmologic Symptoms	no blurred vision L; no blurred vision R  · Negative ENMT Symptoms	no vertigo; no nasal congestion  · Negative Respiratory and Thorax Symptoms	no dyspnea; no cough  · Negative Cardiovascular Symptoms	no chest pain; no palpitations  · Cardiovascular Symptoms	dyspnea on exertion  · Gastrointestinal Symptoms	nausea; vomiting; diarrhea; abdominal pain  · Negative General Genitourinary Symptoms	no flank pain L; no dysuria; normal urinary frequency  · General Genitourinary Symptoms	flank pain R  · Negative Musculoskeletal Symptoms	no arthralgia; no arthritis  · Negative Neurological Symptoms	no syncope; no vertigo  · Negative Psychiatric Symptoms	no depression; no anxiety  · Negative Hematology Symptoms	no gum bleeding; no nose bleeding  · Negative Lymphatic Symptoms	no enlarged lymph nodes; no tender lymph nodes  · Negative Endocrine Symptoms	no cold intolerance; no heat intolerance  · Allergic/Immunologic	negative    Recent Ill Contacts:	[] No	[] Yes:  Recent Travel History:	[] No	[] Yes:  Recent Animal/Insect Exposure/Tick Bites:	[] No	[] Yes:    Allergies    No Known Allergies    Intolerances      Antimicrobials:  meropenem  IVPB 1000 milliGRAM(s) IV Intermittent every 8 hours      Other Medications:  acetaminophen     Tablet .. 650 milliGRAM(s) Oral every 6 hours PRN  enoxaparin Injectable 40 milliGRAM(s) SubCutaneous every 24 hours  lactobacillus acidophilus 1 Tablet(s) Oral daily  multivitamin 1 Tablet(s) Oral daily  oxycodone    5 mG/acetaminophen 325 mG 1 Tablet(s) Oral every 8 hours PRN  potassium chloride  10 mEq/100 mL IVPB 10 milliEquivalent(s) IV Intermittent every 1 hour  sodium chloride 0.9% lock flush 3 milliLiter(s) IV Push every 8 hours  sodium chloride 0.9%. 1000 milliLiter(s) IV Continuous <Continuous>      FAMILY HISTORY:  No pertinent family history in first degree relatives    PAST MEDICAL & SURGICAL HISTORY:  No pertinent past medical history  No significant past surgical history      SOCIAL HISTORY:    IMMUNIZATIONS  [] Up to Date		[] Not Up to Date:  Recent Immunizations:	[] No	[] Yes:    Daily     Daily   Head Circumference:  Vital Signs Last 24 Hrs  T(C): 36.8 (31 Aug 2022 07:15), Max: 39.1 (30 Aug 2022 17:50)  T(F): 98.3 (31 Aug 2022 07:15), Max: 102.4 (30 Aug 2022 17:50)  HR: 64 (31 Aug 2022 07:15) (64 - 98)  BP: 98/59 (31 Aug 2022 07:15) (97/57 - 105/62)  BP(mean): --  RR: 18 (31 Aug 2022 07:15) (16 - 18)  SpO2: 100% (31 Aug 2022 07:15) (98% - 100%)    Parameters below as of 31 Aug 2022 07:15  Patient On (Oxygen Delivery Method): room air      PHYSICAL EXAM:  GENERAL: Sitting comfortable in bed, in no acute distress  HENMT: Atraumatic, moist mucous membranes, no oropharyngeal exudates or vesicles, uvula is midline EYES: Clear bilaterally, PERRL, EOMs intact b/l  HEART: RRR, S1/S2, no murmur/gallops/rubs  RESPIRATORY: Clear to auscultation bilaterally, no wheezes/rhonchi/rales  ABDOMEN: +BS, soft, nontender, nondistended  EXTREMITIES: No lower extremity edema, +2 radial pulses b/l  NEURO:  A&Ox4, no focal motor deficits or sensory deficits   Heme/LYMPH: No ecchymosis or bruising, no anterior/posterior cervical or supraclavicular LAD  SKIN:  Skin normal color for race, warm, dry and intact. No evidence of rash.      Respiratory Support:		[x] No	[] Yes:  Vasoactive medication infusion:	[x] No	[] Yes:  Venous catheters:		[x] No	[] Yes:  Bladder catheter:		[x] No	[] Yes:    Lab Results:                        9.9    8.17  )-----------( 244      ( 31 Aug 2022 06:35 )             28.9     08-31    139  |  106  |  6<L>  ----------------------------<  102<H>  3.0<L>   |  21<L>  |  0.52    Ca    8.3<L>      31 Aug 2022 06:35  Phos  2.1     08-31  Mg     1.70     08-31    TPro  6.5  /  Alb  3.1<L>  /  TBili  0.3  /  DBili  x   /  AST  21  /  ALT  68<H>  /  AlkPhos  120  08-31    LIVER FUNCTIONS - ( 31 Aug 2022 06:35 )  Alb: 3.1 g/dL / Pro: 6.5 g/dL / ALK PHOS: 120 U/L / ALT: 68 U/L / AST: 21 U/L / GGT: x                 MICROBIOLOGY    [] Pathology slides reviewed and/or discussed with pathologist  [] Microbiology findings discussed with microbiologist or slides reviewed  [] Images erviewed with radiologist  [] Case discussed with an attending physician in addition to the patient's primary physician  [] Records, reports from outside [facility] reviewed    [] Patient requires continued monitoring for:  [] Total critical care time spent by attending physician: __ minutes, excluding procedure time. Consultation Requested by:    Patient is a 22y old  Female who presents with a chief complaint of Fever, headache, abd pain (30 Aug 2022 14:44)    HPI:  21 y/o Female, Estonian speaking, no significant PMHx presents to the ED complaining of abdominal pain, nausea, vomiting for three days prior to arrival. Patient states that pain, 7/10, started in her upper abdomen and Rt lower chest area. She describes as sharp and radiating to her right flank area. Patient states pain is worse when standing up. Patient endorses multiple episodes of non-bilious vomiting. Patient also endorses non-bloody diarrhea also. Patient denies dysuria and increased urinary frequency. Patient states pain is worse when she takes in a deep breath. Patient endorses fever, chills and malaise at home. Patient also endorses dyspnea on exertion. Patient denies recent travel, sick contacts, cough. Patient denies oral contraceptive use but does endorse having contraceptive implant in left arm.    In ED UA was found to be positive for infection. CTAP w/IV contrast showed right pyelonephritis. Labs were significant for leukocytosis of 12.23, bandemia of 11.5, D-dimer of 683 and transaminitis. Patient was also found to be Enterovirus positive. (29 Aug 2022 03:50)       Review of Systems:  · General Symptoms	fever; chills; malaise  · Negative Skin Symptoms	no rash; no itching  · Negative Ophthalmologic Symptoms	no blurred vision L; no blurred vision R  · Negative ENMT Symptoms	no vertigo; no nasal congestion  · Negative Respiratory and Thorax Symptoms	no dyspnea; no cough  · Negative Cardiovascular Symptoms	no chest pain; no palpitations  · Cardiovascular Symptoms	dyspnea on exertion  · Gastrointestinal Symptoms	nausea; vomiting; diarrhea; abdominal pain  · Negative General Genitourinary Symptoms	no flank pain L; no dysuria; normal urinary frequency  · General Genitourinary Symptoms	flank pain R  · Negative Musculoskeletal Symptoms	no arthralgia; no arthritis  · Negative Neurological Symptoms	no syncope; no vertigo  · Negative Psychiatric Symptoms	no depression; no anxiety  · Negative Hematology Symptoms	no gum bleeding; no nose bleeding  · Negative Lymphatic Symptoms	no enlarged lymph nodes; no tender lymph nodes  · Negative Endocrine Symptoms	no cold intolerance; no heat intolerance  · Allergic/Immunologic	negative    Recent Ill Contacts:	[] No	[] Yes:  Recent Travel History:	[] No	[] Yes:  Recent Animal/Insect Exposure/Tick Bites:	[] No	[] Yes:    Allergies    No Known Allergies    Intolerances      Antimicrobials:  meropenem  IVPB 1000 milliGRAM(s) IV Intermittent every 8 hours      Other Medications:  acetaminophen     Tablet .. 650 milliGRAM(s) Oral every 6 hours PRN  enoxaparin Injectable 40 milliGRAM(s) SubCutaneous every 24 hours  lactobacillus acidophilus 1 Tablet(s) Oral daily  multivitamin 1 Tablet(s) Oral daily  oxycodone    5 mG/acetaminophen 325 mG 1 Tablet(s) Oral every 8 hours PRN  potassium chloride  10 mEq/100 mL IVPB 10 milliEquivalent(s) IV Intermittent every 1 hour  sodium chloride 0.9% lock flush 3 milliLiter(s) IV Push every 8 hours  sodium chloride 0.9%. 1000 milliLiter(s) IV Continuous <Continuous>      FAMILY HISTORY:  No pertinent family history in first degree relatives    PAST MEDICAL & SURGICAL HISTORY:  No pertinent past medical history  No significant past surgical history      SOCIAL HISTORY:    IMMUNIZATIONS  [] Up to Date		[] Not Up to Date:  Recent Immunizations:	[] No	[] Yes:    Daily     Daily   Head Circumference:  Vital Signs Last 24 Hrs  T(C): 36.8 (31 Aug 2022 07:15), Max: 39.1 (30 Aug 2022 17:50)  T(F): 98.3 (31 Aug 2022 07:15), Max: 102.4 (30 Aug 2022 17:50)  HR: 64 (31 Aug 2022 07:15) (64 - 98)  BP: 98/59 (31 Aug 2022 07:15) (97/57 - 105/62)  BP(mean): --  RR: 18 (31 Aug 2022 07:15) (16 - 18)  SpO2: 100% (31 Aug 2022 07:15) (98% - 100%)    Parameters below as of 31 Aug 2022 07:15  Patient On (Oxygen Delivery Method): room air      PHYSICAL EXAM:  GENERAL: Sitting comfortable in bed, in no acute distress  HENMT: Atraumatic, moist mucous membranes, no oropharyngeal exudates or vesicles, uvula is midline EYES: Clear bilaterally, PERRL, EOMs intact b/l  HEART: RRR, S1/S2, no murmur/gallops/rubs  RESPIRATORY: Clear to auscultation bilaterally, no wheezes/rhonchi/rales  ABDOMEN: +BS, soft, nontender, nondistended, no CV tenderness  EXTREMITIES: No lower extremity edema, +2 radial pulses b/l  NEURO:  A&Ox4, no focal motor deficits or sensory deficits   Heme/LYMPH: No ecchymosis or bruising, no anterior/posterior cervical or supraclavicular LAD  SKIN:  Skin warm, dry and intact. No evidence of rash.      Respiratory Support:		[x] No	[] Yes:  Vasoactive medication infusion:	[x] No	[] Yes:  Venous catheters:		[x] No	[] Yes:  Bladder catheter:		[x] No	[] Yes:    Lab Results:                        9.9    8.17  )-----------( 244      ( 31 Aug 2022 06:35 )             28.9     08-31    139  |  106  |  6<L>  ----------------------------<  102<H>  3.0<L>   |  21<L>  |  0.52    Ca    8.3<L>      31 Aug 2022 06:35  Phos  2.1     08-31  Mg     1.70     08-31    TPro  6.5  /  Alb  3.1<L>  /  TBili  0.3  /  DBili  x   /  AST  21  /  ALT  68<H>  /  AlkPhos  120  08-31    LIVER FUNCTIONS - ( 31 Aug 2022 06:35 )  Alb: 3.1 g/dL / Pro: 6.5 g/dL / ALK PHOS: 120 U/L / ALT: 68 U/L / AST: 21 U/L / GGT: x                  Consultation Requested by:    Patient is a 22y old  Female who presents with a chief complaint of Fever, headache, abd pain (30 Aug 2022 14:44)    HPI:  21 y/o Female, Georgian speaking, no significant PMHx presents to the ED complaining of abdominal pain, nausea, vomiting for three days prior to arrival. Patient states that pain, 7/10, started in her upper abdomen and Rt lower chest area. She describes as sharp and radiating to her right flank area. Patient states pain is worse when standing up. Patient endorses multiple episodes of non-bilious vomiting. Patient also endorses non-bloody diarrhea also. Patient denies dysuria and increased urinary frequency. Patient states pain is worse when she takes in a deep breath. Patient endorses fever, chills and malaise at home. Patient also endorses dyspnea on exertion. Patient denies recent travel, sick contacts, cough. Patient denies oral contraceptive use but does endorse having contraceptive implant in left arm.    In ED UA was found to be positive for infection. CTAP w/IV contrast showed right pyelonephritis. Labs were significant for leukocytosis of 12.23, bandemia of 11.5, D-dimer of 683 and transaminitis. Patient was also found to be Enterovirus positive. (29 Aug 2022 03:50)       Review of Systems:  · General Symptoms	fever; chills; malaise  · Negative Skin Symptoms	no rash; no itching  · Negative Ophthalmologic Symptoms	no blurred vision L; no blurred vision R  · Negative ENMT Symptoms	no vertigo; no nasal congestion  · Negative Respiratory and Thorax Symptoms	no dyspnea; no cough  · Negative Cardiovascular Symptoms	no chest pain; no palpitations  · Cardiovascular Symptoms	dyspnea on exertion  · Gastrointestinal Symptoms	nausea; vomiting; diarrhea; abdominal pain  · Negative General Genitourinary Symptoms	no flank pain L; no dysuria; normal urinary frequency  · General Genitourinary Symptoms	flank pain R  · Negative Musculoskeletal Symptoms	no arthralgia; no arthritis  · Negative Neurological Symptoms	no syncope; no vertigo  · Negative Psychiatric Symptoms	no depression; no anxiety  · Negative Hematology Symptoms	no gum bleeding; no nose bleeding  · Negative Lymphatic Symptoms	no enlarged lymph nodes; no tender lymph nodes  · Negative Endocrine Symptoms	no cold intolerance; no heat intolerance  · Allergic/Immunologic	negative    Recent Ill Contacts:	[] No	[] Yes:  Recent Travel History:	[] No	[] Yes:  Recent Animal/Insect Exposure/Tick Bites:	[] No	[] Yes:    Allergies    No Known Allergies    Intolerances      Antimicrobials:  meropenem  IVPB 1000 milliGRAM(s) IV Intermittent every 8 hours      Other Medications:  acetaminophen     Tablet .. 650 milliGRAM(s) Oral every 6 hours PRN  enoxaparin Injectable 40 milliGRAM(s) SubCutaneous every 24 hours  lactobacillus acidophilus 1 Tablet(s) Oral daily  multivitamin 1 Tablet(s) Oral daily  oxycodone    5 mG/acetaminophen 325 mG 1 Tablet(s) Oral every 8 hours PRN  potassium chloride  10 mEq/100 mL IVPB 10 milliEquivalent(s) IV Intermittent every 1 hour  sodium chloride 0.9% lock flush 3 milliLiter(s) IV Push every 8 hours  sodium chloride 0.9%. 1000 milliLiter(s) IV Continuous <Continuous>      FAMILY HISTORY:  No pertinent family history in first degree relatives    PAST MEDICAL & SURGICAL HISTORY:  No pertinent past medical history  No significant past surgical history      SOCIAL HISTORY:    IMMUNIZATIONS  [] Up to Date		[] Not Up to Date:  Recent Immunizations:	[] No	[] Yes:    Daily     Daily   Head Circumference:  Vital Signs Last 24 Hrs  T(C): 36.8 (31 Aug 2022 07:15), Max: 39.1 (30 Aug 2022 17:50)  T(F): 98.3 (31 Aug 2022 07:15), Max: 102.4 (30 Aug 2022 17:50)  HR: 64 (31 Aug 2022 07:15) (64 - 98)  BP: 98/59 (31 Aug 2022 07:15) (97/57 - 105/62)  BP(mean): --  RR: 18 (31 Aug 2022 07:15) (16 - 18)  SpO2: 100% (31 Aug 2022 07:15) (98% - 100%)    Parameters below as of 31 Aug 2022 07:15  Patient On (Oxygen Delivery Method): room air      PHYSICAL EXAM:  GENERAL: Sitting comfortable in bed, in no acute distress  HENMT: Atraumatic, moist mucous membranes, no oropharyngeal exudates or vesicles, uvula is midline EYES: Clear bilaterally, PERRL, EOMs intact b/l  HEART: RRR, S1/S2, no murmur/gallops/rubs  RESPIRATORY: Clear to auscultation bilaterally, no wheezes/rhonchi/rales  ABDOMEN: +BS, soft, nontender, nondistended, no CV tenderness  EXTREMITIES: No lower extremity edema, +2 radial pulses b/l  NEURO:  A&Ox4, no focal motor deficits or sensory deficits   Heme/LYMPH: No ecchymosis or bruising, no anterior/posterior cervical or supraclavicular LAD  SKIN:  Skin warm, dry and intact. No evidence of rash.      Respiratory Support:		[x] No	[] Yes:  Vasoactive medication infusion:	[x] No	[] Yes:  Venous catheters:		[x] No	[] Yes:  Bladder catheter:		[x] No	[] Yes:    Lab Results:                        9.9    8.17  )-----------( 244      ( 31 Aug 2022 06:35 )             28.9     08-31    139  |  106  |  6<L>  ----------------------------<  102<H>  3.0<L>   |  21<L>  |  0.52    Ca    8.3<L>      31 Aug 2022 06:35  Phos  2.1     08-31  Mg     1.70     08-31    TPro  6.5  /  Alb  3.1<L>  /  TBili  0.3  /  DBili  x   /  AST  21  /  ALT  68<H>  /  AlkPhos  120  08-31    LIVER FUNCTIONS - ( 31 Aug 2022 06:35 )  Alb: 3.1 g/dL / Pro: 6.5 g/dL / ALK PHOS: 120 U/L / ALT: 68 U/L / AST: 21 U/L / GGT: x                  Consultation Requested by:    Patient is a 22y old  Female who presents with a chief complaint of Fever, headache, abd pain (30 Aug 2022 14:44)    HPI:  23 y/o Female, Tajik speaking, no significant PMHx presents to the ED complaining of abdominal pain, nausea, vomiting for three days prior to arrival. Patient states that pain, 7/10, started in her upper abdomen and Rt lower chest area. She describes as sharp and radiating to her right flank area. Patient states pain is worse when standing up. Patient endorses multiple episodes of non-bilious vomiting. Patient also endorses non-bloody diarrhea also. Patient denies dysuria and increased urinary frequency. Patient states pain is worse when she takes in a deep breath. Patient endorses fever, chills and malaise at home. Patient also endorses dyspnea on exertion. Patient denies recent travel, sick contacts, cough. Patient denies oral contraceptive use but does endorse having contraceptive implant in left arm.    In ED UA was found to be positive for infection. CTAP w/IV contrast showed right pyelonephritis. Labs were significant for leukocytosis of 12.23, bandemia of 11.5, D-dimer of 683 and transaminitis. Patient was also found to be Enterovirus positive. (29 Aug 2022 03:50)       Review of Systems:  · General Symptoms	fever; chills; malaise  · Negative Skin Symptoms	no rash; no itching  · Negative Ophthalmologic Symptoms	no blurred vision L; no blurred vision R  · Negative ENMT Symptoms	no vertigo; no nasal congestion  · Negative Respiratory and Thorax Symptoms	no dyspnea; no cough  · Negative Cardiovascular Symptoms	no chest pain; no palpitations  · Cardiovascular Symptoms	dyspnea on exertion  · Gastrointestinal Symptoms	nausea; vomiting; diarrhea; abdominal pain  · Negative General Genitourinary Symptoms	no flank pain L; no dysuria; normal urinary frequency  · General Genitourinary Symptoms	flank pain R  · Negative Musculoskeletal Symptoms	no arthralgia; no arthritis  · Negative Neurological Symptoms	no syncope; no vertigo  · Negative Psychiatric Symptoms	no depression; no anxiety  · Negative Hematology Symptoms	no gum bleeding; no nose bleeding  · Negative Lymphatic Symptoms	no enlarged lymph nodes; no tender lymph nodes  · Negative Endocrine Symptoms	no cold intolerance; no heat intolerance  · Allergic/Immunologic	negative    Recent Ill Contacts:	[] No	[] Yes:  Recent Travel History:	[] No	[] Yes:  Recent Animal/Insect Exposure/Tick Bites:	[] No	[] Yes:    Allergies    No Known Allergies    Intolerances      Antimicrobials:  meropenem  IVPB 1000 milliGRAM(s) IV Intermittent every 8 hours      Other Medications:  acetaminophen     Tablet .. 650 milliGRAM(s) Oral every 6 hours PRN  enoxaparin Injectable 40 milliGRAM(s) SubCutaneous every 24 hours  lactobacillus acidophilus 1 Tablet(s) Oral daily  multivitamin 1 Tablet(s) Oral daily  oxycodone    5 mG/acetaminophen 325 mG 1 Tablet(s) Oral every 8 hours PRN  potassium chloride  10 mEq/100 mL IVPB 10 milliEquivalent(s) IV Intermittent every 1 hour  sodium chloride 0.9% lock flush 3 milliLiter(s) IV Push every 8 hours  sodium chloride 0.9%. 1000 milliLiter(s) IV Continuous <Continuous>      FAMILY HISTORY:  No pertinent family history in first degree relatives    PAST MEDICAL & SURGICAL HISTORY:  No pertinent past medical history  No significant past surgical history      SOCIAL HISTORY:    IMMUNIZATIONS  [] Up to Date		[] Not Up to Date:  Recent Immunizations:	[] No	[] Yes:    Daily     Daily   Head Circumference:  Vital Signs Last 24 Hrs  T(C): 36.8 (31 Aug 2022 07:15), Max: 39.1 (30 Aug 2022 17:50)  T(F): 98.3 (31 Aug 2022 07:15), Max: 102.4 (30 Aug 2022 17:50)  HR: 64 (31 Aug 2022 07:15) (64 - 98)  BP: 98/59 (31 Aug 2022 07:15) (97/57 - 105/62)  BP(mean): --  RR: 18 (31 Aug 2022 07:15) (16 - 18)  SpO2: 100% (31 Aug 2022 07:15) (98% - 100%)    Parameters below as of 31 Aug 2022 07:15  Patient On (Oxygen Delivery Method): room air      PHYSICAL EXAM:  GENERAL: Sitting comfortable in bed, in no acute distress  HENMT: Atraumatic, moist mucous membranes, no oropharyngeal exudates or vesicles, uvula is midline EYES: Clear bilaterally, PERRL, EOMs intact b/l  HEART: RRR, S1/S2, no murmur/gallops/rubs  RESPIRATORY: Clear to auscultation bilaterally, no wheezes/rhonchi/rales  ABDOMEN: +BS, soft, nontender, nondistended, no CV tenderness  EXTREMITIES: No lower extremity edema, +2 radial pulses b/l  NEURO:  A&Ox4, no focal motor deficits or sensory deficits   Heme/LYMPH: No ecchymosis or bruising, no anterior/posterior cervical or supraclavicular LAD  SKIN:  Skin warm, dry and intact. No evidence of rash.      Respiratory Support:		[x] No	[] Yes:  Vasoactive medication infusion:	[x] No	[] Yes:  Venous catheters:		[x] No	[] Yes:  Bladder catheter:		[x] No	[] Yes:    Lab Results:                        9.9    8.17  )-----------( 244      ( 31 Aug 2022 06:35 )             28.9     08-31    139  |  106  |  6<L>  ----------------------------<  102<H>  3.0<L>   |  21<L>  |  0.52    Ca    8.3<L>      31 Aug 2022 06:35  Phos  2.1     08-31  Mg     1.70     08-31    TPro  6.5  /  Alb  3.1<L>  /  TBili  0.3  /  DBili  x   /  AST  21  /  ALT  68<H>  /  AlkPhos  120  08-31    LIVER FUNCTIONS - ( 31 Aug 2022 06:35 )  Alb: 3.1 g/dL / Pro: 6.5 g/dL / ALK PHOS: 120 U/L / ALT: 68 U/L / AST: 21 U/L / GGT: x

## 2022-09-01 DIAGNOSIS — R79.89 OTHER SPECIFIED ABNORMAL FINDINGS OF BLOOD CHEMISTRY: ICD-10-CM

## 2022-09-01 LAB
ALBUMIN SERPL ELPH-MCNC: 3.1 G/DL — LOW (ref 3.3–5)
ALP SERPL-CCNC: 113 U/L — SIGNIFICANT CHANGE UP (ref 40–120)
ALT FLD-CCNC: 62 U/L — HIGH (ref 4–33)
ANION GAP SERPL CALC-SCNC: 12 MMOL/L — SIGNIFICANT CHANGE UP (ref 7–14)
AST SERPL-CCNC: 20 U/L — SIGNIFICANT CHANGE UP (ref 4–32)
BASOPHILS # BLD AUTO: 0 K/UL — SIGNIFICANT CHANGE UP (ref 0–0.2)
BASOPHILS NFR BLD AUTO: 0 % — SIGNIFICANT CHANGE UP (ref 0–2)
BILIRUB DIRECT SERPL-MCNC: <0.2 MG/DL — SIGNIFICANT CHANGE UP (ref 0–0.3)
BILIRUB INDIRECT FLD-MCNC: >0.1 MG/DL — SIGNIFICANT CHANGE UP (ref 0–1)
BILIRUB SERPL-MCNC: 0.3 MG/DL — SIGNIFICANT CHANGE UP (ref 0.2–1.2)
BLASTS # FLD: 0.9 % — HIGH (ref 0–0)
BUN SERPL-MCNC: 6 MG/DL — LOW (ref 7–23)
CALCIUM SERPL-MCNC: 8.3 MG/DL — LOW (ref 8.4–10.5)
CHLORIDE SERPL-SCNC: 102 MMOL/L — SIGNIFICANT CHANGE UP (ref 98–107)
CO2 SERPL-SCNC: 22 MMOL/L — SIGNIFICANT CHANGE UP (ref 22–31)
CREAT SERPL-MCNC: 0.56 MG/DL — SIGNIFICANT CHANGE UP (ref 0.5–1.3)
EGFR: 133 ML/MIN/1.73M2 — SIGNIFICANT CHANGE UP
EOSINOPHIL # BLD AUTO: 0.57 K/UL — HIGH (ref 0–0.5)
EOSINOPHIL NFR BLD AUTO: 7.9 % — HIGH (ref 0–6)
FERRITIN SERPL-MCNC: 286 NG/ML — HIGH (ref 15–150)
GIANT PLATELETS BLD QL SMEAR: PRESENT — SIGNIFICANT CHANGE UP
GLUCOSE SERPL-MCNC: 97 MG/DL — SIGNIFICANT CHANGE UP (ref 70–99)
HAPTOGLOB SERPL-MCNC: 433 MG/DL — HIGH (ref 34–200)
HCT VFR BLD CALC: 30.3 % — LOW (ref 34.5–45)
HGB BLD-MCNC: 9.7 G/DL — LOW (ref 11.5–15.5)
IANC: 4.53 K/UL — SIGNIFICANT CHANGE UP (ref 1.8–7.4)
IRON SATN MFR SERPL: 10 % — LOW (ref 14–50)
IRON SATN MFR SERPL: 20 UG/DL — LOW (ref 30–160)
LDH SERPL L TO P-CCNC: 285 U/L — HIGH (ref 135–225)
LYMPHOCYTES # BLD AUTO: 0.76 K/UL — LOW (ref 1–3.3)
LYMPHOCYTES # BLD AUTO: 10.5 % — LOW (ref 13–44)
MACROCYTES BLD QL: SLIGHT — SIGNIFICANT CHANGE UP
MAGNESIUM SERPL-MCNC: 2 MG/DL — SIGNIFICANT CHANGE UP (ref 1.6–2.6)
MANUAL SMEAR VERIFICATION: SIGNIFICANT CHANGE UP
MCHC RBC-ENTMCNC: 27.6 PG — SIGNIFICANT CHANGE UP (ref 27–34)
MCHC RBC-ENTMCNC: 32 GM/DL — SIGNIFICANT CHANGE UP (ref 32–36)
MCV RBC AUTO: 86.1 FL — SIGNIFICANT CHANGE UP (ref 80–100)
MICROCYTES BLD QL: SIGNIFICANT CHANGE UP
MONOCYTES # BLD AUTO: 0.51 K/UL — SIGNIFICANT CHANGE UP (ref 0–0.9)
MONOCYTES NFR BLD AUTO: 7 % — SIGNIFICANT CHANGE UP (ref 2–14)
NEUTROPHILS # BLD AUTO: 5.08 K/UL — SIGNIFICANT CHANGE UP (ref 1.8–7.4)
NEUTROPHILS NFR BLD AUTO: 68.4 % — SIGNIFICANT CHANGE UP (ref 43–77)
NEUTS BAND # BLD: 1.8 % — SIGNIFICANT CHANGE UP (ref 0–6)
OVALOCYTES BLD QL SMEAR: SLIGHT — SIGNIFICANT CHANGE UP
PHOSPHATE SERPL-MCNC: 2.1 MG/DL — LOW (ref 2.5–4.5)
PLAT MORPH BLD: NORMAL — SIGNIFICANT CHANGE UP
PLATELET # BLD AUTO: 294 K/UL — SIGNIFICANT CHANGE UP (ref 150–400)
PLATELET COUNT - ESTIMATE: NORMAL — SIGNIFICANT CHANGE UP
POTASSIUM SERPL-MCNC: 3.9 MMOL/L — SIGNIFICANT CHANGE UP (ref 3.5–5.3)
POTASSIUM SERPL-SCNC: 3.9 MMOL/L — SIGNIFICANT CHANGE UP (ref 3.5–5.3)
PROT SERPL-MCNC: 6.4 G/DL — SIGNIFICANT CHANGE UP (ref 6–8.3)
RBC # BLD: 3.52 M/UL — LOW (ref 3.8–5.2)
RBC # FLD: 14.6 % — HIGH (ref 10.3–14.5)
RBC BLD AUTO: NORMAL — SIGNIFICANT CHANGE UP
ROULEAUX BLD QL SMEAR: PRESENT
SODIUM SERPL-SCNC: 136 MMOL/L — SIGNIFICANT CHANGE UP (ref 135–145)
TIBC SERPL-MCNC: 196 UG/DL — LOW (ref 220–430)
UIBC SERPL-MCNC: 176 UG/DL — SIGNIFICANT CHANGE UP (ref 110–370)
VARIANT LYMPHS # BLD: 3.5 % — SIGNIFICANT CHANGE UP (ref 0–6)
VIT B12 SERPL-MCNC: 582 PG/ML — SIGNIFICANT CHANGE UP (ref 200–900)
WBC # BLD: 7.23 K/UL — SIGNIFICANT CHANGE UP (ref 3.8–10.5)
WBC # FLD AUTO: 7.23 K/UL — SIGNIFICANT CHANGE UP (ref 3.8–10.5)

## 2022-09-01 PROCEDURE — 99223 1ST HOSP IP/OBS HIGH 75: CPT

## 2022-09-01 PROCEDURE — 88189 FLOWCYTOMETRY/READ 16 & >: CPT

## 2022-09-01 PROCEDURE — 99233 SBSQ HOSP IP/OBS HIGH 50: CPT

## 2022-09-01 RX ORDER — SODIUM,POTASSIUM PHOSPHATES 278-250MG
1 POWDER IN PACKET (EA) ORAL ONCE
Refills: 0 | Status: COMPLETED | OUTPATIENT
Start: 2022-09-01 | End: 2022-09-01

## 2022-09-01 RX ADMIN — SODIUM CHLORIDE 3 MILLILITER(S): 9 INJECTION INTRAMUSCULAR; INTRAVENOUS; SUBCUTANEOUS at 13:24

## 2022-09-01 RX ADMIN — Medication 1 PACKET(S): at 12:48

## 2022-09-01 RX ADMIN — ENOXAPARIN SODIUM 40 MILLIGRAM(S): 100 INJECTION SUBCUTANEOUS at 05:48

## 2022-09-01 RX ADMIN — Medication 500 MILLIGRAM(S): at 07:03

## 2022-09-01 RX ADMIN — Medication 63.75 MILLIMOLE(S): at 12:48

## 2022-09-01 RX ADMIN — Medication 1 TABLET(S): at 11:56

## 2022-09-01 RX ADMIN — SODIUM CHLORIDE 75 MILLILITER(S): 9 INJECTION INTRAMUSCULAR; INTRAVENOUS; SUBCUTANEOUS at 18:28

## 2022-09-01 RX ADMIN — SODIUM CHLORIDE 75 MILLILITER(S): 9 INJECTION INTRAMUSCULAR; INTRAVENOUS; SUBCUTANEOUS at 07:03

## 2022-09-01 RX ADMIN — Medication 500 MILLIGRAM(S): at 18:51

## 2022-09-01 NOTE — PROGRESS NOTE ADULT - PROBLEM SELECTOR PLAN 1
secondary to pyelonephritis and entero/rhinovirus infection. Opxr=081.2, Leukocytosis 12.3K; Pt toxic appearing with severe diaphoresis and recurrent fevers. Lactate normal.    - RRT 8/29 for T 103 and HR up to 160s  - Broadened to meropenem on 8/29  - UCx with >100k ecoli. Now narrowed to ciprofloxacin per ID (sensitive)  - fever curve improved  - Appreciate ID consult  - supportive care for entero/rhinovirus  - HIV negative  - GC neg secondary to pyelonephritis and entero/rhinovirus infection. Duvs=120.2, Leukocytosis 12.3K; Pt toxic appearing with severe diaphoresis and recurrent fevers. Lactate normal.    - RRT 8/29 for T 103 and HR up to 160s  - Broadened to meropenem on 8/29  - UCx with >100k ecoli. Now narrowed to ciprofloxacin per ID (sensitive)  - fever curve improved  - Appreciate ID consult  - supportive care for entero/rhinovirus  - HIV negative  - GC neg secondary to pyelonephritis and entero/rhinovirus infection. Wtip=612.2, Leukocytosis 12.3K; Pt toxic appearing with severe diaphoresis and recurrent fevers. Lactate normal.    - RRT 8/29 for T 103 and HR up to 160s  - Broadened to meropenem on 8/29  - UCx with >100k ecoli. Now narrowed to ciprofloxacin per ID (sensitive)  - fever curve improved  - Appreciate ID consult  - supportive care for entero/rhinovirus  - HIV negative  - GC neg CBC with 0.9% blasts today. Appreciate Hematology input and recommendations. Smear pending. Follow up additional recs.

## 2022-09-01 NOTE — PROGRESS NOTE ADULT - ASSESSMENT
21 y/o Female, Latvian speaking, no significant PMHx a/w sepsis secondary to acute Rt pyelonephritis and co-current entero/rhinovirus infection; Found to have mild splenomegaly. 21 y/o Female, Bolivian speaking, no significant PMHx a/w sepsis secondary to acute Rt pyelonephritis and co-current entero/rhinovirus infection; Found to have mild splenomegaly. 23 y/o Female, Cymro speaking, no significant PMHx a/w sepsis secondary to acute Rt pyelonephritis and co-current entero/rhinovirus infection; Found to have mild splenomegaly.

## 2022-09-01 NOTE — CONSULT NOTE ADULT - ASSESSMENT
Pt is a 23 yo Liberian speaking female who was admitted for sepsis secondary to pyelonephritis, coincidentally found to have entero/rhinovirus URI, as well as 0.9% blasts on peripheral smear. Hematology consulted for blasts in the peripheral blood.    #Blasts in peripheral blood  - Will get a Smear  - Patient may need a bone marrow biopsy and/or flow based on smear findings.    Full consult note to follow.    July Meyers M.D.  Hematology and Medical Oncology Fellow  Pager: [phone]  For weekends and evenings (5 pm - 8 am), please page Heme/Onc fellow on call. Pt is a 23 yo Haitian speaking female who was admitted for sepsis secondary to pyelonephritis, coincidentally found to have entero/rhinovirus URI, as well as 0.9% blasts on peripheral smear. Hematology consulted for blasts in the peripheral blood.    #Blasts in peripheral blood  - Will get a Smear  - Patient may need a bone marrow biopsy and/or flow based on smear findings.    Full consult note to follow.    July Meyers M.D.  Hematology and Medical Oncology Fellow  Pager: [phone]  For weekends and evenings (5 pm - 8 am), please page Heme/Onc fellow on call. Pt is a 23 yo Armenian speaking female who was admitted for sepsis secondary to pyelonephritis, coincidentally found to have entero/rhinovirus URI, as well as 0.9% blasts on peripheral smear. Hematology consulted for blasts in the peripheral blood.    #Blasts in peripheral blood  - Will get a Smear  - Patient may need a bone marrow biopsy and/or flow based on smear findings.    Full consult note to follow.    July Meyers M.D.  Hematology and Medical Oncology Fellow  Pager: [phone]  For weekends and evenings (5 pm - 8 am), please page Heme/Onc fellow on call. Pt is a 23 yo Nigerian speaking female who was admitted for sepsis secondary to pyelonephritis, coincidentally found to have entero/rhinovirus URI, as well as 0.9% blasts on peripheral smear. Hematology consulted for blasts in the peripheral blood.    #Blasts in peripheral blood  - Peripheral Smear viewed. Normocytic, normochromic RBCs, with platelets grossly normal. No overt dysplastic cells seen on PBS. However, on cello vision, rare dysplastic cell (unclear morphology)  - Peripheral Flow sent  - Patient may need a bone marrow biopsy based on flow findings.    #Anemia  Pt with a normocytic anemia  - Please get Vitamin B12, Folate, retic count, and hemolysis labs.    Will continue to follow with you. Case d/w Dr. Massey.    July Meyers M.D.  Hematology and Medical Oncology Fellow  Pager: [phone]  For weekends and evenings (5 pm - 8 am), please page Heme/Onc fellow on call. Pt is a 23 yo [place] Islander speaking female who was admitted for sepsis secondary to pyelonephritis, coincidentally found to have entero/rhinovirus URI, as well as 0.9% blasts on peripheral smear. Hematology consulted for blasts in the peripheral blood.    #Blasts in peripheral blood  - Peripheral Smear viewed. Normocytic, normochromic RBCs, with platelets grossly normal. No overt dysplastic cells seen on PBS. However, on cello vision, rare dysplastic cell (unclear morphology)  - Peripheral Flow sent  - Patient may need a bone marrow biopsy based on flow findings.    #Anemia  Pt with a normocytic anemia  - Please get Vitamin B12, Folate, retic count, and hemolysis labs.    Will continue to follow with you. Case d/w Dr. Massey.    July Meyers M.D.  Hematology and Medical Oncology Fellow  Pager: [phone]  For weekends and evenings (5 pm - 8 am), please page Heme/Onc fellow on call. Pt is a 23 yo Austrian speaking female who was admitted for sepsis secondary to pyelonephritis, coincidentally found to have entero/rhinovirus URI, as well as 0.9% blasts on peripheral smear. Hematology consulted for blasts in the peripheral blood.    #Blasts in peripheral blood  - Peripheral Smear viewed. Normocytic, normochromic RBCs, with platelets grossly normal. No overt dysplastic cells seen on PBS. However, on cello vision, rare dysplastic cell (unclear morphology)  - Peripheral Flow sent  - Patient may need a bone marrow biopsy based on flow findings.    #Anemia  Pt with a normocytic anemia  - Please get Vitamin B12, Folate, retic count, and hemolysis labs.    Will continue to follow with you. Case d/w Dr. Massey.    July Meyers M.D.  Hematology and Medical Oncology Fellow  Pager: [phone]  For weekends and evenings (5 pm - 8 am), please page Heme/Onc fellow on call.

## 2022-09-01 NOTE — PROGRESS NOTE ADULT - PROBLEM SELECTOR PLAN 6
Lovenox 40 mg subcutaneous daily. CT showing mild splenomegaly, measuring 13 cm in CC dimension.  ?in setting of acute viral infections. Asymptomatic. No further workup needed at this time. Can consider repeat imaging as outpatient for re-evaluation and further workup if remains enlarged

## 2022-09-01 NOTE — PROGRESS NOTE ADULT - PROBLEM SELECTOR PLAN 4
Supportive care Improving    Alk phos 130->124 -> 124 -> 120 ->113 (wnl)  AST 40 -> 82 ->36 -> 21 -> 20 (wnl)  ALT 92 -> 128 -> 96 ->68 -> 62 (elevated)  - Acute hep panel negative. Possibly 2/2 viral infection  - Repeat as outpatient to ensure normalization or further workup if remains elevated

## 2022-09-01 NOTE — CONSULT NOTE ADULT - SUBJECTIVE AND OBJECTIVE BOX
HEMATOLOGY ONCOLOGY CONSULT     Patient is a 22y old  Female who presents with a chief complaint of Fever, headache, abd pain (01 Sep 2022 11:52)      HPI:  23 y/o Female, [place] speaking, no significant PMHx presents to the ED complaining of abdominal pain, nausea, vomiting for three days. Patient states that pain, 7/10, started in her upper abdomen and Rt lower chest area. She describes as sharp and radiating to her right flank area. Patient states pain is worse when standing up. Patient endorses multiple episodes of non-bilious vomiting. Patient also endorses non-bloody diarrhea also. Patient denies dysuria and increased urinary frequency. Patient states pain is worse when she takes in a deep breath. Patient endorses fever, chills and malaise at home. Patient also endorses dyspnea on exertion. Patient denies recent travel, sick contacts, cough. Patient denies oral contraceptive use but does endorse having contraceptive implant in left arm.    In ED UA was found to be positive for infection. CTAP w/IV contrast showed right pyelonephritis. Labs were significant for leukocytosis of 12.23, bandemia of 11.5, D-dimer of 683 and transaminitis. Patient was also found to be Enterovirus positive. (29 Aug 2022 03:50)       ROS:  Negative except for:    PAST MEDICAL & SURGICAL HISTORY:  No pertinent past medical history      No significant past surgical history          SOCIAL HISTORY:    FAMILY HISTORY:  No pertinent family history in first degree relatives        MEDICATIONS  (STANDING):  ciprofloxacin     Tablet 500 milliGRAM(s) Oral every 12 hours  enoxaparin Injectable 40 milliGRAM(s) SubCutaneous every 24 hours  lactobacillus acidophilus 1 Tablet(s) Oral daily  multivitamin 1 Tablet(s) Oral daily  sodium chloride 0.9% lock flush 3 milliLiter(s) IV Push every 8 hours  sodium chloride 0.9%. 1000 milliLiter(s) (75 mL/Hr) IV Continuous <Continuous>    MEDICATIONS  (PRN):  acetaminophen     Tablet .. 650 milliGRAM(s) Oral every 6 hours PRN Mild Pain (1 - 3), Moderate Pain (4 - 6)      Allergies    No Known Allergies    Intolerances        Vital Signs Last 24 Hrs  T(C): 37 (01 Sep 2022 12:03), Max: 37.3 (31 Aug 2022 15:21)  T(F): 98.6 (01 Sep 2022 12:03), Max: 99.1 (31 Aug 2022 15:21)  HR: 82 (01 Sep 2022 12:03) (68 - 82)  BP: 106/62 (01 Sep 2022 12:03) (92/58 - 111/70)  BP(mean): --  RR: 18 (01 Sep 2022 12:03) (16 - 18)  SpO2: 99% (01 Sep 2022 12:03) (98% - 100%)    Parameters below as of 01 Sep 2022 12:03  Patient On (Oxygen Delivery Method): room air        PHYSICAL EXAM  General: adult in NAD  HEENT: clear oropharynx, anicteric sclera, pink conjunctiva  Neck: supple  CV: normal S1/S2 with no murmur rubs or gallops  Lungs: positive air movement b/l ant lungs,clear to auscultation, no wheezes, no rales  Abdomen: soft non-tender non-distended, no hepatosplenomegaly  Ext: no clubbing cyanosis or edema  Skin: no rashes and no petechiae  Neuro: alert and oriented X 4, no focal deficits      LABS:                          9.7    7.23  )-----------( 294      ( 01 Sep 2022 06:16 )             30.3         Mean Cell Volume : 86.1 fL  Mean Cell Hemoglobin : 27.6 pg  Mean Cell Hemoglobin Concentration : 32.0 gm/dL  Auto Neutrophil # : 5.08 K/uL  Auto Lymphocyte # : 0.76 K/uL  Auto Monocyte # : 0.51 K/uL  Auto Eosinophil # : 0.57 K/uL  Auto Basophil # : 0.00 K/uL  Auto Neutrophil % : 68.4 %  Auto Lymphocyte % : 10.5 %  Auto Monocyte % : 7.0 %  Auto Eosinophil % : 7.9 %  Auto Basophil % : 0.0 %      09-01    136  |  102  |  6<L>  ----------------------------<  97  3.9   |  22  |  0.56    Ca    8.3<L>      01 Sep 2022 06:16  Phos  2.1     09-01  Mg     2.00     09-01    TPro  6.4  /  Alb  3.1<L>  /  TBili  0.3  /  DBili  <0.2  /  AST  20  /  ALT  62<H>  /  AlkPhos  113  09-01          Iron - Total Binding Capacity.: 176 ug/dL (08-30 @ 07:05)  Ferritin, Serum: 381 ng/mL (08-30 @ 07:05)              BLOOD SMEAR INTERPRETATION:       RADIOLOGY & ADDITIONAL STUDIES:       HEMATOLOGY ONCOLOGY CONSULT     Patient is a 22y old  Female who presents with a chief complaint of Fever, headache, abd pain (01 Sep 2022 11:52)      HPI:  23 y/o Female, [place] speaking, no significant PMHx presents to the ED complaining of abdominal pain, nausea, vomiting for three days. Patient states that pain, 7/10, started in her upper abdomen and Rt lower chest area. She describes as sharp and radiating to her right flank area. Patient states pain is worse when standing up. Patient endorses multiple episodes of non-bilious vomiting. Patient also endorses non-bloody diarrhea also. Patient denies dysuria and increased urinary frequency. Patient states pain is worse when she takes in a deep breath. Patient endorses fever, chills and malaise at home. Patient also endorses dyspnea on exertion. Patient denies recent travel, sick contacts, cough. Patient denies oral contraceptive use but does endorse having contraceptive implant in left arm.    In ED UA was found to be positive for infection. CTAP w/IV contrast showed right pyelonephritis. Labs were significant for leukocytosis of 12.23, bandemia of 11.5, D-dimer of 683 and transaminitis. Patient was also found to be Enterovirus positive. (29 Aug 2022 03:50)       ROS:  Negative except for:    PAST MEDICAL & SURGICAL HISTORY:  No pertinent past medical history      No significant past surgical history          SOCIAL HISTORY:    FAMILY HISTORY:  No pertinent family history in first degree relatives        MEDICATIONS  (STANDING):  ciprofloxacin     Tablet 500 milliGRAM(s) Oral every 12 hours  enoxaparin Injectable 40 milliGRAM(s) SubCutaneous every 24 hours  lactobacillus acidophilus 1 Tablet(s) Oral daily  multivitamin 1 Tablet(s) Oral daily  sodium chloride 0.9% lock flush 3 milliLiter(s) IV Push every 8 hours  sodium chloride 0.9%. 1000 milliLiter(s) (75 mL/Hr) IV Continuous <Continuous>    MEDICATIONS  (PRN):  acetaminophen     Tablet .. 650 milliGRAM(s) Oral every 6 hours PRN Mild Pain (1 - 3), Moderate Pain (4 - 6)      Allergies    No Known Allergies    Intolerances        Vital Signs Last 24 Hrs  T(C): 37 (01 Sep 2022 12:03), Max: 37.3 (31 Aug 2022 15:21)  T(F): 98.6 (01 Sep 2022 12:03), Max: 99.1 (31 Aug 2022 15:21)  HR: 82 (01 Sep 2022 12:03) (68 - 82)  BP: 106/62 (01 Sep 2022 12:03) (92/58 - 111/70)  BP(mean): --  RR: 18 (01 Sep 2022 12:03) (16 - 18)  SpO2: 99% (01 Sep 2022 12:03) (98% - 100%)    Parameters below as of 01 Sep 2022 12:03  Patient On (Oxygen Delivery Method): room air        PHYSICAL EXAM  General: adult in NAD  HEENT: clear oropharynx, anicteric sclera, pink conjunctiva  Neck: supple  CV: normal S1/S2 with no murmur rubs or gallops  Lungs: positive air movement b/l ant lungs,clear to auscultation, no wheezes, no rales  Abdomen: soft non-tender non-distended, no hepatosplenomegaly  Ext: no clubbing cyanosis or edema  Skin: no rashes and no petechiae  Neuro: alert and oriented X 4, no focal deficits      LABS:                          9.7    7.23  )-----------( 294      ( 01 Sep 2022 06:16 )             30.3         Mean Cell Volume : 86.1 fL  Mean Cell Hemoglobin : 27.6 pg  Mean Cell Hemoglobin Concentration : 32.0 gm/dL  Auto Neutrophil # : 5.08 K/uL  Auto Lymphocyte # : 0.76 K/uL  Auto Monocyte # : 0.51 K/uL  Auto Eosinophil # : 0.57 K/uL  Auto Basophil # : 0.00 K/uL  Auto Neutrophil % : 68.4 %  Auto Lymphocyte % : 10.5 %  Auto Monocyte % : 7.0 %  Auto Eosinophil % : 7.9 %  Auto Basophil % : 0.0 %      09-01    136  |  102  |  6<L>  ----------------------------<  97  3.9   |  22  |  0.56    Ca    8.3<L>      01 Sep 2022 06:16  Phos  2.1     09-01  Mg     2.00     09-01    TPro  6.4  /  Alb  3.1<L>  /  TBili  0.3  /  DBili  <0.2  /  AST  20  /  ALT  62<H>  /  AlkPhos  113  09-01          Iron - Total Binding Capacity.: 176 ug/dL (08-30 @ 07:05)  Ferritin, Serum: 381 ng/mL (08-30 @ 07:05)              BLOOD SMEAR INTERPRETATION:       RADIOLOGY & ADDITIONAL STUDIES:       HEMATOLOGY ONCOLOGY CONSULT     Patient is a 22y old  Female who presents with a chief complaint of Fever, headache, abd pain (01 Sep 2022 11:52)      HPI:  23 y/o Female, [place] speaking, no significant PMHx presents to the ED complaining of abdominal pain, nausea, vomiting for three days. Patient states that pain, 7/10, started in her upper abdomen and Rt lower chest area. She describes as sharp and radiating to her right flank area. Patient states pain is worse when standing up. Patient endorses multiple episodes of non-bilious vomiting. Patient also endorses non-bloody diarrhea also. Patient denies dysuria and increased urinary frequency. Patient states pain is worse when she takes in a deep breath. Patient endorses fever, chills and malaise at home. Patient also endorses dyspnea on exertion. Patient denies recent travel, sick contacts, cough. Patient denies oral contraceptive use but does endorse having contraceptive implant in left arm.    In ED UA was found to be positive for infection. CTAP w/IV contrast showed right pyelonephritis. Labs were significant for leukocytosis of 12.23, bandemia of 11.5, D-dimer of 683 and transaminitis. Patient was also found to be Enterovirus positive. (29 Aug 2022 03:50)       ROS:  Negative except for:    PAST MEDICAL & SURGICAL HISTORY:  No pertinent past medical history      No significant past surgical history          SOCIAL HISTORY:    FAMILY HISTORY:  No pertinent family history in first degree relatives        MEDICATIONS  (STANDING):  ciprofloxacin     Tablet 500 milliGRAM(s) Oral every 12 hours  enoxaparin Injectable 40 milliGRAM(s) SubCutaneous every 24 hours  lactobacillus acidophilus 1 Tablet(s) Oral daily  multivitamin 1 Tablet(s) Oral daily  sodium chloride 0.9% lock flush 3 milliLiter(s) IV Push every 8 hours  sodium chloride 0.9%. 1000 milliLiter(s) (75 mL/Hr) IV Continuous <Continuous>    MEDICATIONS  (PRN):  acetaminophen     Tablet .. 650 milliGRAM(s) Oral every 6 hours PRN Mild Pain (1 - 3), Moderate Pain (4 - 6)      Allergies    No Known Allergies    Intolerances        Vital Signs Last 24 Hrs  T(C): 37 (01 Sep 2022 12:03), Max: 37.3 (31 Aug 2022 15:21)  T(F): 98.6 (01 Sep 2022 12:03), Max: 99.1 (31 Aug 2022 15:21)  HR: 82 (01 Sep 2022 12:03) (68 - 82)  BP: 106/62 (01 Sep 2022 12:03) (92/58 - 111/70)  BP(mean): --  RR: 18 (01 Sep 2022 12:03) (16 - 18)  SpO2: 99% (01 Sep 2022 12:03) (98% - 100%)    Parameters below as of 01 Sep 2022 12:03  Patient On (Oxygen Delivery Method): room air        PHYSICAL EXAM  General: adult in NAD  HEENT: clear oropharynx, anicteric sclera, pink conjunctiva  Neck: supple  CV: normal S1/S2 with no murmur rubs or gallops  Lungs: positive air movement b/l ant lungs,clear to auscultation, no wheezes, no rales  Abdomen: soft non-tender non-distended, no hepatosplenomegaly  Ext: no clubbing cyanosis or edema  Skin: no rashes and no petechiae  Neuro: alert and oriented X 4, no focal deficits      LABS:                          9.7    7.23  )-----------( 294      ( 01 Sep 2022 06:16 )             30.3         Mean Cell Volume : 86.1 fL  Mean Cell Hemoglobin : 27.6 pg  Mean Cell Hemoglobin Concentration : 32.0 gm/dL  Auto Neutrophil # : 5.08 K/uL  Auto Lymphocyte # : 0.76 K/uL  Auto Monocyte # : 0.51 K/uL  Auto Eosinophil # : 0.57 K/uL  Auto Basophil # : 0.00 K/uL  Auto Neutrophil % : 68.4 %  Auto Lymphocyte % : 10.5 %  Auto Monocyte % : 7.0 %  Auto Eosinophil % : 7.9 %  Auto Basophil % : 0.0 %      09-01    136  |  102  |  6<L>  ----------------------------<  97  3.9   |  22  |  0.56    Ca    8.3<L>      01 Sep 2022 06:16  Phos  2.1     09-01  Mg     2.00     09-01    TPro  6.4  /  Alb  3.1<L>  /  TBili  0.3  /  DBili  <0.2  /  AST  20  /  ALT  62<H>  /  AlkPhos  113  09-01          Iron - Total Binding Capacity.: 176 ug/dL (08-30 @ 07:05)  Ferritin, Serum: 381 ng/mL (08-30 @ 07:05)              BLOOD SMEAR INTERPRETATION:       RADIOLOGY & ADDITIONAL STUDIES:

## 2022-09-01 NOTE — PROGRESS NOTE ADULT - PROBLEM SELECTOR PLAN 2
UA: (+) pyuria, (+) nitrates, jonh LE, (+) bacteruria   CT A/P c/w right pyelonephritis.  R cva tenderness improved  Now on cipro as above secondary to pyelonephritis and entero/rhinovirus infection. Ufja=491.2, Leukocytosis 12.3K; Pt toxic appearing with severe diaphoresis and recurrent fevers. Lactate normal.    - RRT 8/29 for T 103 and HR up to 160s  - Broadened to meropenem on 8/29  - UCx with >100k ecoli. Now narrowed to ciprofloxacin per ID (sensitive)  - fever curve improved  - Appreciate ID consult  - supportive care for entero/rhinovirus  - HIV negative  - GC neg secondary to pyelonephritis and entero/rhinovirus infection. Jejx=511.2, Leukocytosis 12.3K; Pt toxic appearing with severe diaphoresis and recurrent fevers. Lactate normal.    - RRT 8/29 for T 103 and HR up to 160s  - Broadened to meropenem on 8/29  - UCx with >100k ecoli. Now narrowed to ciprofloxacin per ID (sensitive)  - fever curve improved  - Appreciate ID consult  - supportive care for entero/rhinovirus  - HIV negative  - GC neg secondary to pyelonephritis and entero/rhinovirus infection. Kugk=204.2, Leukocytosis 12.3K; Pt toxic appearing with severe diaphoresis and recurrent fevers. Lactate normal.    - RRT 8/29 for T 103 and HR up to 160s  - Broadened to meropenem on 8/29  - UCx with >100k ecoli. Now narrowed to ciprofloxacin per ID (sensitive)  - fever curve improved  - Appreciate ID consult  - supportive care for entero/rhinovirus  - HIV negative  - GC neg

## 2022-09-01 NOTE — PROGRESS NOTE ADULT - PROBLEM SELECTOR PLAN 3
Improving    Alk phos 130->124 -> 124 -> 120 ->113 (wnl)  AST 40 -> 82 ->36 -> 21 -> 20 (wnl)  ALT 92 -> 128 -> 96 ->68 -> 62 (elevated)  - Acute hep panel negative. Possibly 2/2 viral infection  - Repeat as outpatient to ensure normalization or further workup if remains elevated UA: (+) pyuria, (+) nitrates, jonh LE, (+) bacteruria   CT A/P c/w right pyelonephritis.  R cva tenderness improved  Now on cipro as above

## 2022-09-01 NOTE — PROGRESS NOTE ADULT - PROBLEM SELECTOR PLAN 5
CT showing mild splenomegaly, measuring 13 cm in CC dimension.  ?in setting of acute viral infections. Asymptomatic. No further workup needed at this time. Can consider repeat imaging as outpatient for re-evaluation and further workup if remains enlarged Supportive care

## 2022-09-01 NOTE — PROGRESS NOTE ADULT - NSPROGADDITIONALINFOA_GEN_ALL_CORE
Mother updated at bedside 9/1/22  Optimized for DC home on PO abx, close outpatient PCP follow up Mother updated at bedside 9/1/22

## 2022-09-01 NOTE — CONSULT NOTE ADULT - ATTENDING COMMENTS
Ms. Castaneda is a 22F with no significant PMH, who presented with sepsis secondary to pyelonephritis and was coincidentally found to have entero/rhinovirus URI. Hematology consulted for 0.9% blasts in the peripheral blood.    She is being treated with antibiotics for pyelonephritis, initially with meropenem and now narrowed to ciprofloxacin. She has a normal WBC (7.23) and plt (294) today, with normocytic anemia (Hgb 9.7, MCV 86.1). Iron studies showed ferritin 381, 9% saturation, iron 16, TIBC 176. Abdominal imaging revealed a mildly enlarged spleen to 13 cm.     1. Blasts in peripheral blood: 0.9% blasts reported on today's CBC differential. We reviewed the peripheral smear and did not note any overt dysplastic, blast-like cells, though we did find one blast-appearing cell on [employer].   - Send peripheral flow cytometry to confirm there is no underlying hematologic malignancy  - Repeat CBC with differential tomorrow  - If she has persistent blasts with negative flow, she will potentially need a BMBx    2. Normocytic anemia: Her Hgb was 11.3 on admission, downtrended to 9.7 today. Normocytic with MCV 86. Iron studies consistent with mixed iron deficiency and chronic inflammation likely due to infection.  - No indication for transfusion currently. Please maintain Hgb > 7.  - To complete evaluation, please send vitamin B12, TSH, reticulocyte count, LDH, and haptoglobin    Nitza Massey MD  Hematology Attending
22 year old female with E colu UTI assocaited yeplo     Fever may be due to pyelo    Sensitivity reviewed.    Can change to Cipro 500 po q 12 to finish 10 d coruse of abx    Call with questions

## 2022-09-01 NOTE — PROGRESS NOTE ADULT - SUBJECTIVE AND OBJECTIVE BOX
Patient is a 22y old  Female who presents with a chief complaint of Fever, headache, abd pain (31 Aug 2022 12:40)      SUBJECTIVE / OVERNIGHT EVENTS: No acute events. Afebrile overnight. Feels well this morning, flank pain improved, no nausea, vomiting, chest pain, shortness of breath, fever, chills, rash.  ID#[id number].     MEDICATIONS  (STANDING):  ciprofloxacin     Tablet 500 milliGRAM(s) Oral every 12 hours  enoxaparin Injectable 40 milliGRAM(s) SubCutaneous every 24 hours  lactobacillus acidophilus 1 Tablet(s) Oral daily  multivitamin 1 Tablet(s) Oral daily  sodium chloride 0.9% lock flush 3 milliLiter(s) IV Push every 8 hours  sodium chloride 0.9%. 1000 milliLiter(s) (75 mL/Hr) IV Continuous <Continuous>    MEDICATIONS  (PRN):  acetaminophen     Tablet .. 650 milliGRAM(s) Oral every 6 hours PRN Mild Pain (1 - 3), Moderate Pain (4 - 6)    CAPILLARY BLOOD GLUCOSE    I&O's Summary    PHYSICAL EXAM:  Vital Signs Last 24 Hrs  T(C): 36.9 (01 Sep 2022 05:00), Max: 37.3 (31 Aug 2022 15:21)  T(F): 98.4 (01 Sep 2022 05:00), Max: 99.1 (31 Aug 2022 15:21)  HR: 71 (01 Sep 2022 05:00) (68 - 77)  BP: 92/58 (01 Sep 2022 05:00) (92/58 - 111/70)  BP(mean): --  RR: 16 (01 Sep 2022 05:00) (16 - 18)  SpO2: 100% (01 Sep 2022 05:00) (98% - 100%)    Parameters below as of 01 Sep 2022 05:00  Patient On (Oxygen Delivery Method): room air    CONSTITUTIONAL: NAD, well-developed, well-groomed  EYES: EOMI; conjunctiva and sclera clear  ENMT: Moist oral mucosa  NECK: Supple, no JVD  RESPIRATORY: Normal respiratory effort; lungs are clear to auscultation bilaterally  CARDIOVASCULAR: Regular rate and rhythm, normal S1 and S2, no murmur/rub/gallop; No lower extremity edema; Peripheral pulses are 2+ bilaterally  ABDOMEN: Nontender to palpation, normoactive bowel sounds, no rebound/guarding  PSYCH: calm, affect appropriate  SKIN: No rashes; no palpable lesions    LABS:                        9.7    7.23  )-----------( 294      ( 01 Sep 2022 06:16 )             30.3     09-01    136  |  102  |  6<L>  ----------------------------<  97  3.9   |  22  |  0.56    Ca    8.3<L>      01 Sep 2022 06:16  Phos  2.1     09-01  Mg     2.00     09-01    TPro  6.4  /  Alb  3.1<L>  /  TBili  0.3  /  DBili  <0.2  /  AST  20  /  ALT  62<H>  /  AlkPhos  113  09-01    Culture - Urine (collected 29 Aug 2022 18:56)  Source: Clean Catch Clean Catch (Midstream)  Final Report (31 Aug 2022 00:37):    No growth    Culture - Blood (collected 29 Aug 2022 18:54)  Source: .Blood Blood-Peripheral  Preliminary Report (30 Aug 2022 23:02):    No growth to date.    Culture - Blood (collected 29 Aug 2022 18:54)  Source: .Blood Blood-Peripheral  Preliminary Report (30 Aug 2022 23:02):    No growth to date.    Culture - Blood (collected 29 Aug 2022 18:49)  Source: .Blood Blood-Peripheral  Preliminary Report (30 Aug 2022 23:02):    No growth to date.    Culture - Blood (collected 29 Aug 2022 18:49)  Source: .Blood Blood-Peripheral  Preliminary Report (30 Aug 2022 23:02):    No growth to date.    RADIOLOGY & ADDITIONAL TESTS: Reviewed    COORDINATION OF CARE:  Care Discussed with Consultants/Other Providers [Y- Medicine ACP]   Patient is a 22y old  Female who presents with a chief complaint of Fever, headache, abd pain (31 Aug 2022 12:40)      SUBJECTIVE / OVERNIGHT EVENTS: No acute events. Afebrile overnight. Feels well this morning, flank pain improved, no nausea, vomiting, chest pain, shortness of breath, fever, chills, rash.  ID#[id number].     MEDICATIONS  (STANDING):  ciprofloxacin     Tablet 500 milliGRAM(s) Oral every 12 hours  enoxaparin Injectable 40 milliGRAM(s) SubCutaneous every 24 hours  lactobacillus acidophilus 1 Tablet(s) Oral daily  multivitamin 1 Tablet(s) Oral daily  sodium chloride 0.9% lock flush 3 milliLiter(s) IV Push every 8 hours  sodium chloride 0.9%. 1000 milliLiter(s) (75 mL/Hr) IV Continuous <Continuous>    MEDICATIONS  (PRN):  acetaminophen     Tablet .. 650 milliGRAM(s) Oral every 6 hours PRN Mild Pain (1 - 3), Moderate Pain (4 - 6)    CAPILLARY BLOOD GLUCOSE    I&O's Summary    PHYSICAL EXAM:  Vital Signs Last 24 Hrs  T(C): 36.9 (01 Sep 2022 05:00), Max: 37.3 (31 Aug 2022 15:21)  T(F): 98.4 (01 Sep 2022 05:00), Max: 99.1 (31 Aug 2022 15:21)  HR: 71 (01 Sep 2022 05:00) (68 - 77)  BP: 92/58 (01 Sep 2022 05:00) (92/58 - 111/70)  BP(mean): --  RR: 16 (01 Sep 2022 05:00) (16 - 18)  SpO2: 100% (01 Sep 2022 05:00) (98% - 100%)    Parameters below as of 01 Sep 2022 05:00  Patient On (Oxygen Delivery Method): room air    CONSTITUTIONAL: NAD, well-developed, well-groomed  EYES: EOMI; conjunctiva and sclera clear  ENMT: Moist oral mucosa  NECK: Supple, no JVD  RESPIRATORY: Normal respiratory effort; lungs are clear to auscultation bilaterally  CARDIOVASCULAR: Regular rate and rhythm, normal S1 and S2, no murmur/rub/gallop; No lower extremity edema; Peripheral pulses are 2+ bilaterally  ABDOMEN: Nontender to palpation, normoactive bowel sounds, no rebound/guarding  PSYCH: calm, affect appropriate  SKIN: No rashes; no palpable lesions    LABS:                        9.7    7.23  )-----------( 294      ( 01 Sep 2022 06:16 )             30.3     09-01    136  |  102  |  6<L>  ----------------------------<  97  3.9   |  22  |  0.56    Ca    8.3<L>      01 Sep 2022 06:16  Phos  2.1     09-01  Mg     2.00     09-01    TPro  6.4  /  Alb  3.1<L>  /  TBili  0.3  /  DBili  <0.2  /  AST  20  /  ALT  62<H>  /  AlkPhos  113  09-01    Culture - Urine (collected 29 Aug 2022 18:56)  Source: Clean Catch Clean Catch (Midstream)  Final Report (31 Aug 2022 00:37):    No growth    Culture - Blood (collected 29 Aug 2022 18:54)  Source: .Blood Blood-Peripheral  Preliminary Report (30 Aug 2022 23:02):    No growth to date.    Culture - Blood (collected 29 Aug 2022 18:54)  Source: .Blood Blood-Peripheral  Preliminary Report (30 Aug 2022 23:02):    No growth to date.    Culture - Blood (collected 29 Aug 2022 18:49)  Source: .Blood Blood-Peripheral  Preliminary Report (30 Aug 2022 23:02):    No growth to date.    Culture - Blood (collected 29 Aug 2022 18:49)  Source: .Blood Blood-Peripheral  Preliminary Report (30 Aug 2022 23:02):    No growth to date.    RADIOLOGY & ADDITIONAL TESTS: Reviewed    COORDINATION OF CARE:  Care Discussed with Consultants/Other Providers [Y- Medicine ACP]   Patient is a 22y old  Female who presents with a chief complaint of Fever, headache, abd pain (31 Aug 2022 12:40)      SUBJECTIVE / OVERNIGHT EVENTS: No acute events. Afebrile overnight. Feels well this morning, flank pain improved, no nausea, vomiting, chest pain, shortness of breath, fever, chills, rash.  ID#[id number].     MEDICATIONS  (STANDING):  ciprofloxacin     Tablet 500 milliGRAM(s) Oral every 12 hours  enoxaparin Injectable 40 milliGRAM(s) SubCutaneous every 24 hours  lactobacillus acidophilus 1 Tablet(s) Oral daily  multivitamin 1 Tablet(s) Oral daily  sodium chloride 0.9% lock flush 3 milliLiter(s) IV Push every 8 hours  sodium chloride 0.9%. 1000 milliLiter(s) (75 mL/Hr) IV Continuous <Continuous>    MEDICATIONS  (PRN):  acetaminophen     Tablet .. 650 milliGRAM(s) Oral every 6 hours PRN Mild Pain (1 - 3), Moderate Pain (4 - 6)    CAPILLARY BLOOD GLUCOSE    I&O's Summary    PHYSICAL EXAM:  Vital Signs Last 24 Hrs  T(C): 36.9 (01 Sep 2022 05:00), Max: 37.3 (31 Aug 2022 15:21)  T(F): 98.4 (01 Sep 2022 05:00), Max: 99.1 (31 Aug 2022 15:21)  HR: 71 (01 Sep 2022 05:00) (68 - 77)  BP: 92/58 (01 Sep 2022 05:00) (92/58 - 111/70)  BP(mean): --  RR: 16 (01 Sep 2022 05:00) (16 - 18)  SpO2: 100% (01 Sep 2022 05:00) (98% - 100%)    Parameters below as of 01 Sep 2022 05:00  Patient On (Oxygen Delivery Method): room air    CONSTITUTIONAL: NAD, well-developed, well-groomed  EYES: EOMI; conjunctiva and sclera clear  ENMT: Moist oral mucosa  NECK: Supple, no JVD  RESPIRATORY: Normal respiratory effort; lungs are clear to auscultation bilaterally  CARDIOVASCULAR: Regular rate and rhythm, normal S1 and S2, no murmur/rub/gallop; No lower extremity edema; Peripheral pulses are 2+ bilaterally  ABDOMEN: Nontender to palpation, normoactive bowel sounds, no rebound/guarding  PSYCH: calm, affect appropriate  SKIN: No rashes; no palpable lesions    LABS:                        9.7    7.23  )-----------( 294      ( 01 Sep 2022 06:16 )             30.3     09-01    136  |  102  |  6<L>  ----------------------------<  97  3.9   |  22  |  0.56    Ca    8.3<L>      01 Sep 2022 06:16  Phos  2.1     09-01  Mg     2.00     09-01    TPro  6.4  /  Alb  3.1<L>  /  TBili  0.3  /  DBili  <0.2  /  AST  20  /  ALT  62<H>  /  AlkPhos  113  09-01    Culture - Urine (collected 29 Aug 2022 18:56)  Source: Clean Catch Clean Catch (Midstream)  Final Report (31 Aug 2022 00:37):    No growth    Culture - Blood (collected 29 Aug 2022 18:54)  Source: .Blood Blood-Peripheral  Preliminary Report (30 Aug 2022 23:02):    No growth to date.    Culture - Blood (collected 29 Aug 2022 18:54)  Source: .Blood Blood-Peripheral  Preliminary Report (30 Aug 2022 23:02):    No growth to date.    Culture - Blood (collected 29 Aug 2022 18:49)  Source: .Blood Blood-Peripheral  Preliminary Report (30 Aug 2022 23:02):    No growth to date.    Culture - Blood (collected 29 Aug 2022 18:49)  Source: .Blood Blood-Peripheral  Preliminary Report (30 Aug 2022 23:02):    No growth to date.    RADIOLOGY & ADDITIONAL TESTS: Reviewed    COORDINATION OF CARE:  Care Discussed with Consultants/Other Providers [Y- Medicine ACP]

## 2022-09-02 ENCOUNTER — TRANSCRIPTION ENCOUNTER (OUTPATIENT)
Age: 22
End: 2022-09-02

## 2022-09-02 VITALS
TEMPERATURE: 98 F | SYSTOLIC BLOOD PRESSURE: 100 MMHG | RESPIRATION RATE: 18 BRPM | OXYGEN SATURATION: 100 % | DIASTOLIC BLOOD PRESSURE: 60 MMHG | HEART RATE: 70 BPM

## 2022-09-02 LAB
ANION GAP SERPL CALC-SCNC: 11 MMOL/L — SIGNIFICANT CHANGE UP (ref 7–14)
BASOPHILS # BLD AUTO: 0.02 K/UL — SIGNIFICANT CHANGE UP (ref 0–0.2)
BASOPHILS NFR BLD AUTO: 0.3 % — SIGNIFICANT CHANGE UP (ref 0–2)
BUN SERPL-MCNC: 7 MG/DL — SIGNIFICANT CHANGE UP (ref 7–23)
CALCIUM SERPL-MCNC: 7.8 MG/DL — LOW (ref 8.4–10.5)
CHLORIDE SERPL-SCNC: 106 MMOL/L — SIGNIFICANT CHANGE UP (ref 98–107)
CO2 SERPL-SCNC: 22 MMOL/L — SIGNIFICANT CHANGE UP (ref 22–31)
CREAT SERPL-MCNC: 0.49 MG/DL — LOW (ref 0.5–1.3)
EGFR: 137 ML/MIN/1.73M2 — SIGNIFICANT CHANGE UP
EOSINOPHIL # BLD AUTO: 0.47 K/UL — SIGNIFICANT CHANGE UP (ref 0–0.5)
EOSINOPHIL NFR BLD AUTO: 7.4 % — HIGH (ref 0–6)
FOLATE SERPL-MCNC: 17.9 NG/ML — HIGH (ref 3.1–17.5)
GLUCOSE SERPL-MCNC: 84 MG/DL — SIGNIFICANT CHANGE UP (ref 70–99)
HCT VFR BLD CALC: 25.7 % — LOW (ref 34.5–45)
HGB BLD-MCNC: 8.3 G/DL — LOW (ref 11.5–15.5)
IANC: 3.67 K/UL — SIGNIFICANT CHANGE UP (ref 1.8–7.4)
IMM GRANULOCYTES NFR BLD AUTO: 1.1 % — SIGNIFICANT CHANGE UP (ref 0–1.5)
LDH SERPL L TO P-CCNC: 159 U/L — SIGNIFICANT CHANGE UP (ref 135–225)
LYMPHOCYTES # BLD AUTO: 1.63 K/UL — SIGNIFICANT CHANGE UP (ref 1–3.3)
LYMPHOCYTES # BLD AUTO: 25.8 % — SIGNIFICANT CHANGE UP (ref 13–44)
MAGNESIUM SERPL-MCNC: 1.7 MG/DL — SIGNIFICANT CHANGE UP (ref 1.6–2.6)
MCHC RBC-ENTMCNC: 27.9 PG — SIGNIFICANT CHANGE UP (ref 27–34)
MCHC RBC-ENTMCNC: 32.3 GM/DL — SIGNIFICANT CHANGE UP (ref 32–36)
MCV RBC AUTO: 86.5 FL — SIGNIFICANT CHANGE UP (ref 80–100)
MONOCYTES # BLD AUTO: 0.47 K/UL — SIGNIFICANT CHANGE UP (ref 0–0.9)
MONOCYTES NFR BLD AUTO: 7.4 % — SIGNIFICANT CHANGE UP (ref 2–14)
NEUTROPHILS # BLD AUTO: 3.67 K/UL — SIGNIFICANT CHANGE UP (ref 1.8–7.4)
NEUTROPHILS NFR BLD AUTO: 58 % — SIGNIFICANT CHANGE UP (ref 43–77)
NRBC # BLD: 0 /100 WBCS — SIGNIFICANT CHANGE UP (ref 0–0)
NRBC # FLD: 0 K/UL — SIGNIFICANT CHANGE UP (ref 0–0)
PHOSPHATE SERPL-MCNC: 2.9 MG/DL — SIGNIFICANT CHANGE UP (ref 2.5–4.5)
PLATELET # BLD AUTO: 296 K/UL — SIGNIFICANT CHANGE UP (ref 150–400)
POTASSIUM SERPL-MCNC: 3.8 MMOL/L — SIGNIFICANT CHANGE UP (ref 3.5–5.3)
POTASSIUM SERPL-SCNC: 3.8 MMOL/L — SIGNIFICANT CHANGE UP (ref 3.5–5.3)
RBC # BLD: 2.97 M/UL — LOW (ref 3.8–5.2)
RBC # FLD: 14.4 % — SIGNIFICANT CHANGE UP (ref 10.3–14.5)
RETICS #: 14.6 K/UL — LOW (ref 25–125)
RETICS/RBC NFR: 0.5 % — SIGNIFICANT CHANGE UP (ref 0.5–2.5)
SODIUM SERPL-SCNC: 139 MMOL/L — SIGNIFICANT CHANGE UP (ref 135–145)
TM INTERPRETATION: SIGNIFICANT CHANGE UP
VIT B12 SERPL-MCNC: 675 PG/ML — SIGNIFICANT CHANGE UP (ref 200–900)
WBC # BLD: 6.33 K/UL — SIGNIFICANT CHANGE UP (ref 3.8–10.5)
WBC # FLD AUTO: 6.33 K/UL — SIGNIFICANT CHANGE UP (ref 3.8–10.5)

## 2022-09-02 PROCEDURE — 99239 HOSP IP/OBS DSCHRG MGMT >30: CPT

## 2022-09-02 RX ORDER — LACTOBACILLUS ACIDOPHILUS 100MM CELL
1 CAPSULE ORAL
Qty: 30 | Refills: 0
Start: 2022-09-02 | End: 2022-10-01

## 2022-09-02 RX ORDER — CIPROFLOXACIN LACTATE 400MG/40ML
1 VIAL (ML) INTRAVENOUS
Qty: 10 | Refills: 0
Start: 2022-09-02 | End: 2022-09-06

## 2022-09-02 RX ADMIN — Medication 650 MILLIGRAM(S): at 07:18

## 2022-09-02 RX ADMIN — Medication 1 TABLET(S): at 12:31

## 2022-09-02 RX ADMIN — Medication 500 MILLIGRAM(S): at 07:15

## 2022-09-02 RX ADMIN — SODIUM CHLORIDE 75 MILLILITER(S): 9 INJECTION INTRAMUSCULAR; INTRAVENOUS; SUBCUTANEOUS at 07:24

## 2022-09-02 RX ADMIN — ENOXAPARIN SODIUM 40 MILLIGRAM(S): 100 INJECTION SUBCUTANEOUS at 07:15

## 2022-09-02 RX ADMIN — SODIUM CHLORIDE 3 MILLILITER(S): 9 INJECTION INTRAMUSCULAR; INTRAVENOUS; SUBCUTANEOUS at 14:34

## 2022-09-02 NOTE — CHART NOTE - NSCHARTNOTEFT_GEN_A_CORE
No contraindications to discharge from a Hematology perspective. Patient can follow up outpatient with Dr. Massey outpatient for a follow up. We will send a referral to [facility] and they will reach out to the patient to schedule follow up.    July Meyers M.D.  Hematology and Medical Oncology Fellow  Pager: [phone]  For weekends and evenings (5 pm - 8 am), please page Heme/Onc fellow on call. No contraindications to discharge from a Hematology perspective. Patient can follow up outpatient with Dr. Massey outpatient for a follow up. We will send a referral to [facility] and they will reach out to the patient to schedule follow up.    July Meyers M.D.  Hematology and Medical Oncology Fellow  Pager: [phone]  For weekends and evenings (5 pm - 8 am), please page Heme/Onc fellow on call. No contraindications to discharge from a Hematology perspective. Patient can follow up outpatient with Dr. Massey outpatient for a follow up. We will send a referral to [facility] and they will reach out to the patient to schedule follow up.    July Meyers M.D.  Hematology and Medical Oncology Fellow  Pager: [phone]  For weekends and evenings (5 pm - 8 am), please page Heme/Onc fellow on call. No contraindications to discharge from a Hematology perspective. Patient can follow up outpatient with Dr. Massey outpatient for a follow up. We will send a referral to [facility] and they will reach out to the patient to schedule follow up.    Pt has a follow up appointment on September 29 at 10 am with Dr. Massey. Please include this information in her discharge summary along with the contact information for [facility].    July Meyers M.D.  Hematology and Medical Oncology Fellow  Pager: [phone]  For weekends and evenings (5 pm - 8 am), please page Heme/Onc fellow on call. No contraindications to discharge from a Hematology perspective. Patient can follow up outpatient with Dr. Massey outpatient for a follow up. We will send a referral to [facility] and they will reach out to the patient to schedule follow up.    Pt has a follow up appointment on September 29 at 10 am with Dr. Massey. Please include this information in her discharge summary along with the contact information for [facility].    July Meyers M.D.  Hematology and Medical Oncology Fellow  Pager: [phone]  For weekends and evenings (5 pm - 8 am), please page Heme/Onc fellow on call. No contraindications to discharge from a Hematology perspective. Patient can follow up outpatient with Dr. Massey outpatient for a follow up. We will send a referral to [facility] and they will reach out to the patient to schedule follow up.    Pt has a follow up appointment on September 29 at 10 am with Dr. Massey. Please include this information in her discharge summary along with the contact information for [facility].    July Meyers M.D.  Hematology and Medical Oncology Fellow  Pager: [phone]  For weekends and evenings (5 pm - 8 am), please page Heme/Onc fellow on call.

## 2022-09-02 NOTE — PROGRESS NOTE ADULT - ASSESSMENT
23 y/o Female, Indian speaking, no significant PMHx a/w sepsis secondary to acute Rt pyelonephritis and co-current entero/rhinovirus infection; Found to have mild splenomegaly. 21 y/o Female, Lithuanian speaking, no significant PMHx a/w sepsis secondary to acute Rt pyelonephritis and co-current entero/rhinovirus infection; Found to have mild splenomegaly. 23 y/o Female, English speaking, no significant PMHx a/w sepsis secondary to acute Rt pyelonephritis and co-current entero/rhinovirus infection; Found to have mild splenomegaly.

## 2022-09-02 NOTE — PROGRESS NOTE ADULT - PROBLEM SELECTOR PLAN 6
CT showing mild splenomegaly, measuring 13 cm in CC dimension.  ?in setting of acute viral infections. Asymptomatic. No further workup needed at this time. Can consider repeat imaging as outpatient for re-evaluation and further workup if remains enlarged

## 2022-09-02 NOTE — DISCHARGE NOTE NURSING/CASE MANAGEMENT/SOCIAL WORK - NSDCPEFALRISK_GEN_ALL_CORE
For information on Fall & Injury Prevention, visit: [email or web address] OR  [email or web address]://www.[email or web address]/[email or web address]-tips-[email or web address]-injury OR  https://www.cdc.gov/steadi/patient.html For information on Fall & Injury Prevention, visit: [email or web address]://www.[email or web address]/news/[email or web address]-[email or web address]-health-[email or web address]-mobility OR  https://www.[facility]/news/fall-prevention-tips-to-avoid-injury OR  https://www.cdc.gov/steadi/patient.html For information on Fall & Injury Prevention, visit: https://www.[facility]/news/fall-prevention-protects-[email or web address]-maintains-health-[email or web address]-mobility OR  https://www.[facility]/news/fall-prevention-tips-to-avoid-injury OR  https://www.cdc.gov/steadi/patient.html

## 2022-09-02 NOTE — DISCHARGE NOTE NURSING/CASE MANAGEMENT/SOCIAL WORK - PATIENT PORTAL LINK FT
You can access the FollowMyHealth Patient Portal offered by [facility] by registering at the following website: [email or web address]. By joining [employer]’s FollowMyHealth portal, you will also be able to view your health information using other applications (apps) compatible with our system. You can access the FollowMyHealth Patient Portal offered by [facility] by registering at the following website: [email or web address]. By joining [employer]’s FollowMyHealth portal, you will also be able to view your health information using other applications (apps) compatible with our system. You can access the FollowMyHealth Patient Portal offered by [facility] by registering at the following website: [email or web address]. By joining [employer]’s FollowMyHealth portal, you will also be able to view your health information using other applications (apps) compatible with our system.

## 2022-09-02 NOTE — PROGRESS NOTE ADULT - PROBLEM SELECTOR PLAN 1
CBC with 0.9% blasts yesterday. Appreciate Hematology input and recommendations.   rpt cbc today is ok, discussed with hematology ok for dc and per heme pt can f/u as outpt, pt has a follow up appointment on September 29 at 10 am with Dr. Massey.  dc today, dc planning time spent in coordination 45 mts ( preparing dc summary and plan)  Plan discussed with ACP

## 2022-09-02 NOTE — PROGRESS NOTE ADULT - PROBLEM SELECTOR PLAN 3
UA: (+) pyuria, (+) nitrates, jonh LE, (+) bacteruria   CT A/P c/w right pyelonephritis.  R cva tenderness improved  Now on cipro as above

## 2022-09-02 NOTE — PROGRESS NOTE ADULT - REASON FOR ADMISSION
Fever, headache, abd pain

## 2022-09-02 NOTE — PROGRESS NOTE ADULT - PROBLEM SELECTOR PLAN 4
Improving    Alk phos 130->124 -> 124 -> 120 ->113 (wnl)  AST 40 -> 82 ->36 -> 21 -> 20 (wnl)  ALT 92 -> 128 -> 96 ->68 -> 62 (elevated)  - Acute hep panel negative. Possibly 2/2 viral infection  - Repeat as outpatient to ensure normalization or further workup if remains elevated

## 2022-09-02 NOTE — PROGRESS NOTE ADULT - PROBLEM SELECTOR PLAN 2
secondary to pyelonephritis and entero/rhinovirus infection. Jlqy=298.2, Leukocytosis 12.3K; Pt toxic appearing with severe diaphoresis and recurrent fevers. Lactate normal.    - RRT 8/29 for T 103 and HR up to 160s  - Broadened to meropenem on 8/29  - UCx with >100k ecoli. Now narrowed to ciprofloxacin per ID (sensitive) to complete 10 day course  - fever curve improved  - Appreciate ID consult  - supportive care for entero/rhinovirus  - HIV negative  - GC neg secondary to pyelonephritis and entero/rhinovirus infection. Mxzg=919.2, Leukocytosis 12.3K; Pt toxic appearing with severe diaphoresis and recurrent fevers. Lactate normal.    - RRT 8/29 for T 103 and HR up to 160s  - Broadened to meropenem on 8/29  - UCx with >100k ecoli. Now narrowed to ciprofloxacin per ID (sensitive) to complete 10 day course  - fever curve improved  - Appreciate ID consult  - supportive care for entero/rhinovirus  - HIV negative  - GC neg secondary to pyelonephritis and entero/rhinovirus infection. Yexd=412.2, Leukocytosis 12.3K; Pt toxic appearing with severe diaphoresis and recurrent fevers. Lactate normal.    - RRT 8/29 for T 103 and HR up to 160s  - Broadened to meropenem on 8/29  - UCx with >100k ecoli. Now narrowed to ciprofloxacin per ID (sensitive) to complete 10 day course  - fever curve improved  - Appreciate ID consult  - supportive care for entero/rhinovirus  - HIV negative  - GC neg

## 2022-09-02 NOTE — PROGRESS NOTE ADULT - SUBJECTIVE AND OBJECTIVE BOX
Patient is a 22y old  Female who presents with a chief complaint of Fever, headache, abd pain (01 Sep 2022 13:42)      SUBJECTIVE / OVERNIGHT EVENTS: Pt seen and examined at 12:15pm, no overnight events, feels well, has no complaints, sister at bedside. No other new issues reported.    MEDICATIONS  (STANDING):  ciprofloxacin     Tablet 500 milliGRAM(s) Oral every 12 hours  enoxaparin Injectable 40 milliGRAM(s) SubCutaneous every 24 hours  lactobacillus acidophilus 1 Tablet(s) Oral daily  multivitamin 1 Tablet(s) Oral daily  sodium chloride 0.9% lock flush 3 milliLiter(s) IV Push every 8 hours  sodium chloride 0.9%. 1000 milliLiter(s) (75 mL/Hr) IV Continuous <Continuous>    MEDICATIONS  (PRN):  acetaminophen     Tablet .. 650 milliGRAM(s) Oral every 6 hours PRN Mild Pain (1 - 3), Moderate Pain (4 - 6)      Vital Signs Last 24 Hrs  T(C): 36.9 (02 Sep 2022 10:01), Max: 37.1 (01 Sep 2022 21:10)  T(F): 98.4 (02 Sep 2022 10:01), Max: 98.7 (01 Sep 2022 21:10)  HR: 70 (02 Sep 2022 10:01) (70 - 80)  BP: 100/60 (02 Sep 2022 10:01) (93/61 - 105/62)  BP(mean): --  RR: 18 (02 Sep 2022 10:01) (17 - 18)  SpO2: 100% (02 Sep 2022 10:01) (97% - 100%)    Parameters below as of 02 Sep 2022 10:01  Patient On (Oxygen Delivery Method): room air      CAPILLARY BLOOD GLUCOSE        I&O's Summary      PHYSICAL EXAM:  GENERAL: NAD, well-developed  CHEST/LUNG: Clear to auscultation bilaterally; No wheeze  HEART: Regular rate and rhythm  ABDOMEN: Soft, Nontender, Nondistended  EXTREMITIES: no LE edema  PSYCH: Calm  NEUROLOGY: AA, answers all questions appropriately  SKIN: No rashes or lesions    LABS:                        8.3    6.33  )-----------( 296      ( 02 Sep 2022 07:04 )             25.7     09-02    139  |  106  |  7   ----------------------------<  84  3.8   |  22  |  0.49<L>    Ca    7.8<L>      02 Sep 2022 07:04  Phos  2.9     09-02  Mg     1.70     09-02    TPro  6.4  /  Alb  3.1<L>  /  TBili  0.3  /  DBili  <0.2  /  AST  20  /  ALT  62<H>  /  AlkPhos  113  09-01              RADIOLOGY & ADDITIONAL TESTS:    Imaging Personally Reviewed:    Consultant(s) Notes Reviewed:      Care Discussed with Consultants/Other Providers:

## 2022-09-03 LAB

## 2022-11-08 NOTE — ED PROVIDER NOTE - CLINICAL SUMMARY MEDICAL DECISION MAKING FREE TEXT BOX
plan sepsis labs, ua, cxr, IVF, anti emetic, covid , reassess Non-Graft Cartilage Fenestration Text: The cartilage was fenestrated with a 2mm punch biopsy to help facilitate healing.

## 2023-07-12 ENCOUNTER — OUTPATIENT (OUTPATIENT)
Dept: OUTPATIENT SERVICES | Facility: HOSPITAL | Age: 23
LOS: 1 days | End: 2023-07-12

## 2023-07-12 ENCOUNTER — RESULT REVIEW (OUTPATIENT)
Age: 23
End: 2023-07-12

## 2023-07-12 ENCOUNTER — APPOINTMENT (OUTPATIENT)
Dept: OBGYN | Facility: HOSPITAL | Age: 23
End: 2023-07-12

## 2023-07-12 VITALS
DIASTOLIC BLOOD PRESSURE: 55 MMHG | HEART RATE: 58 BPM | HEIGHT: 63 IN | WEIGHT: 106 LBS | SYSTOLIC BLOOD PRESSURE: 109 MMHG | BODY MASS INDEX: 18.78 KG/M2 | TEMPERATURE: 97.9 F

## 2023-07-12 DIAGNOSIS — Z01.419 ENCOUNTER FOR GYNECOLOGICAL EXAMINATION (GENERAL) (ROUTINE) W/OUT ABNORMAL FINDINGS: ICD-10-CM

## 2023-07-12 DIAGNOSIS — Z83.3 FAMILY HISTORY OF DIABETES MELLITUS: ICD-10-CM

## 2023-07-12 DIAGNOSIS — Z80.0 FAMILY HISTORY OF MALIGNANT NEOPLASM OF DIGESTIVE ORGANS: ICD-10-CM

## 2023-07-12 DIAGNOSIS — Z82.49 FAMILY HISTORY OF ISCHEMIC HEART DISEASE AND OTHER DISEASES OF THE CIRCULATORY SYSTEM: ICD-10-CM

## 2023-07-12 DIAGNOSIS — Z78.9 OTHER SPECIFIED HEALTH STATUS: ICD-10-CM

## 2023-07-12 PROBLEM — Z00.00 ENCOUNTER FOR PREVENTIVE HEALTH EXAMINATION: Status: ACTIVE | Noted: 2023-07-12

## 2023-07-12 LAB
A1C WITH ESTIMATED AVERAGE GLUCOSE RESULT: 5 % — SIGNIFICANT CHANGE UP (ref 4–5.6)
ANION GAP SERPL CALC-SCNC: 13 MMOL/L — SIGNIFICANT CHANGE UP (ref 7–14)
BASOPHILS # BLD AUTO: 0.02 K/UL — SIGNIFICANT CHANGE UP (ref 0–0.2)
BASOPHILS NFR BLD AUTO: 0.3 % — SIGNIFICANT CHANGE UP (ref 0–2)
BUN SERPL-MCNC: 10 MG/DL — SIGNIFICANT CHANGE UP (ref 7–23)
CALCIUM SERPL-MCNC: 9.2 MG/DL — SIGNIFICANT CHANGE UP (ref 8.4–10.5)
CHLORIDE SERPL-SCNC: 104 MMOL/L — SIGNIFICANT CHANGE UP (ref 98–107)
CO2 SERPL-SCNC: 24 MMOL/L — SIGNIFICANT CHANGE UP (ref 22–31)
CREAT SERPL-MCNC: 0.53 MG/DL — SIGNIFICANT CHANGE UP (ref 0.5–1.3)
EGFR: 134 ML/MIN/1.73M2 — SIGNIFICANT CHANGE UP
EOSINOPHIL # BLD AUTO: 0.1 K/UL — SIGNIFICANT CHANGE UP (ref 0–0.5)
EOSINOPHIL NFR BLD AUTO: 1.7 % — SIGNIFICANT CHANGE UP (ref 0–6)
ESTIMATED AVERAGE GLUCOSE: 97 — SIGNIFICANT CHANGE UP
GLUCOSE SERPL-MCNC: 79 MG/DL — SIGNIFICANT CHANGE UP (ref 70–99)
HCT VFR BLD CALC: 38.2 % — SIGNIFICANT CHANGE UP (ref 34.5–45)
HGB BLD-MCNC: 12.4 G/DL — SIGNIFICANT CHANGE UP (ref 11.5–15.5)
HIV 1+2 AB+HIV1 P24 AG SERPL QL IA: SIGNIFICANT CHANGE UP
IANC: 3.46 K/UL — SIGNIFICANT CHANGE UP (ref 1.8–7.4)
IMM GRANULOCYTES NFR BLD AUTO: 0.3 % — SIGNIFICANT CHANGE UP (ref 0–0.9)
LYMPHOCYTES # BLD AUTO: 1.85 K/UL — SIGNIFICANT CHANGE UP (ref 1–3.3)
LYMPHOCYTES # BLD AUTO: 32.2 % — SIGNIFICANT CHANGE UP (ref 13–44)
MCHC RBC-ENTMCNC: 28.6 PG — SIGNIFICANT CHANGE UP (ref 27–34)
MCHC RBC-ENTMCNC: 32.5 GM/DL — SIGNIFICANT CHANGE UP (ref 32–36)
MCV RBC AUTO: 88.2 FL — SIGNIFICANT CHANGE UP (ref 80–100)
MONOCYTES # BLD AUTO: 0.3 K/UL — SIGNIFICANT CHANGE UP (ref 0–0.9)
MONOCYTES NFR BLD AUTO: 5.2 % — SIGNIFICANT CHANGE UP (ref 2–14)
NEUTROPHILS # BLD AUTO: 3.46 K/UL — SIGNIFICANT CHANGE UP (ref 1.8–7.4)
NEUTROPHILS NFR BLD AUTO: 60.3 % — SIGNIFICANT CHANGE UP (ref 43–77)
NRBC # BLD: 0 /100 WBCS — SIGNIFICANT CHANGE UP (ref 0–0)
NRBC # FLD: 0 K/UL — SIGNIFICANT CHANGE UP (ref 0–0)
PLATELET # BLD AUTO: 212 K/UL — SIGNIFICANT CHANGE UP (ref 150–400)
POTASSIUM SERPL-MCNC: 4.2 MMOL/L — SIGNIFICANT CHANGE UP (ref 3.5–5.3)
POTASSIUM SERPL-SCNC: 4.2 MMOL/L — SIGNIFICANT CHANGE UP (ref 3.5–5.3)
RBC # BLD: 4.33 M/UL — SIGNIFICANT CHANGE UP (ref 3.8–5.2)
RBC # FLD: 13.2 % — SIGNIFICANT CHANGE UP (ref 10.3–14.5)
SODIUM SERPL-SCNC: 141 MMOL/L — SIGNIFICANT CHANGE UP (ref 135–145)
TSH SERPL-MCNC: 1.18 UIU/ML — SIGNIFICANT CHANGE UP (ref 0.27–4.2)
WBC # BLD: 5.75 K/UL — SIGNIFICANT CHANGE UP (ref 3.8–10.5)
WBC # FLD AUTO: 5.75 K/UL — SIGNIFICANT CHANGE UP (ref 3.8–10.5)

## 2023-07-12 NOTE — DISCUSSION/SUMMARY
[FreeTextEntry1] : Annual Gyn \par --Pap, GC/Chlam done\par --Annual blood work including STD blood work done\par --SBE monthly\par --Diet and exercise\par \par Family planning\par --V-shaped hormonal implant palpable in right upper arm\par --states it was placed in [facility] in 2020 and lasts for 5 years\par --STD counseling, safe sex practices and condoms advised\par Follow up for annual/prn\par [place]ian translation used

## 2023-07-12 NOTE — PHYSICAL EXAM
[Chaperone Present] : A chaperone was present in the examining room during all aspects of the physical examination [FreeTextEntry1] : Feli [Appropriately responsive] : appropriately responsive [Alert] : alert [No Acute Distress] : no acute distress [No Lymphadenopathy] : no lymphadenopathy [Soft] : soft [Non-tender] : non-tender [Non-distended] : non-distended [No HSM] : No HSM [No Lesions] : no lesions [No Mass] : no mass [Oriented x3] : oriented x3 [FreeTextEntry2] : V-shaped hormnal implant palpated in right upper arm [Examination Of The Breasts] : a normal appearance [No Masses] : no breast masses were palpable [Labia Majora] : normal [Labia Minora] : normal [Normal] : normal [Uterine Adnexae] : normal

## 2023-07-12 NOTE — HISTORY OF PRESENT ILLNESS
[HIV Test offered] : HIV Test offered [Syphilis test offered] : Syphilis test offered [Gonorrhea test offered] : Gonorrhea test offered [Chlamydia test offered] : Chlamydia test offered [Hepatitis B test offered] : Hepatitis B test offered [Hepatitis C test offered] : Hepatitis C test offered [N] : Patient reports normal menses [Y] : Positive pregnancy history [TextBox_4] : 24 yo  LMP 5/23 here for initial Gyn exam.  No problems voiced.  Has hormonal implant in right upper arm.  Was placed in [facility] in Oct 2020 and told it lasts for 5 years.  Has irregular cycles with this implant, otherwise happy with it.  Sexually active with one male partner. [PapSmeardate] : unsure [LMPDate] : 05/23 [de-identified] : Hormonal implant [PGxTotal] : 1 [[facility] CenterxFulerm] : 1 [PGHxPremature] : 0 [PGHxAbortions] : 0 [[facility] Arrow[facility] Campusiving] : 1 [Currently Active] : currently active [Men] : men [Vaginal] : vaginal [No] : No [Yes] : Yes [Patient would like to be screened for STIs] : Patient would like to be screened for STIs [FreeTextEntry3] : Hormonal implant

## 2023-07-13 LAB
C TRACH RRNA SPEC QL NAA+PROBE: SIGNIFICANT CHANGE UP
HBV SURFACE AG SER-ACNC: SIGNIFICANT CHANGE UP
HCV AB S/CO SERPL IA: 0.14 S/CO — SIGNIFICANT CHANGE UP (ref 0–0.99)
HCV AB SERPL-IMP: SIGNIFICANT CHANGE UP
N GONORRHOEA RRNA SPEC QL NAA+PROBE: SIGNIFICANT CHANGE UP
SPECIMEN SOURCE: SIGNIFICANT CHANGE UP
T PALLIDUM AB TITR SER: NEGATIVE — SIGNIFICANT CHANGE UP

## 2023-07-14 DIAGNOSIS — Z01.419 ENCOUNTER FOR GYNECOLOGICAL EXAMINATION (GENERAL) (ROUTINE) WITHOUT ABNORMAL FINDINGS: ICD-10-CM

## 2023-07-15 LAB
CYTOLOGY SPEC DOC CYTO: SIGNIFICANT CHANGE UP

## 2024-02-26 PROBLEM — Z78.9 OTHER SPECIFIED HEALTH STATUS: Chronic | Status: ACTIVE | Noted: 2022-08-29

## 2024-03-20 ENCOUNTER — APPOINTMENT (OUTPATIENT)
Dept: OBGYN | Facility: HOSPITAL | Age: 24
End: 2024-03-20

## 2024-04-05 ENCOUNTER — APPOINTMENT (OUTPATIENT)
Dept: OBGYN | Facility: HOSPITAL | Age: 24
End: 2024-04-05

## 2024-04-17 ENCOUNTER — APPOINTMENT (OUTPATIENT)
Dept: OBGYN | Facility: CLINIC | Age: 24
End: 2024-04-17
Payer: COMMERCIAL

## 2024-04-17 ENCOUNTER — APPOINTMENT (OUTPATIENT)
Dept: OBGYN | Facility: HOSPITAL | Age: 24
End: 2024-04-17

## 2024-04-17 VITALS — SYSTOLIC BLOOD PRESSURE: 117 MMHG | WEIGHT: 111.8 LBS | DIASTOLIC BLOOD PRESSURE: 72 MMHG

## 2024-04-17 LAB
HCG UR QL: NEGATIVE
QUALITY CONTROL: YES

## 2024-04-17 PROCEDURE — 11976 REMOVE CONTRACEPTIVE CAPSULE: CPT

## 2024-04-17 PROCEDURE — 11981 INSERTION DRUG DLVR IMPLANT: CPT

## 2024-04-17 RX ORDER — SULFAMETHOXAZOLE AND TRIMETHOPRIM 800; 160 MG/1; MG/1
800-160 TABLET ORAL TWICE DAILY
Qty: 10 | Refills: 0 | Status: ACTIVE | COMMUNITY
Start: 2024-04-17 | End: 1900-01-01

## 2024-04-17 NOTE — HISTORY OF PRESENT ILLNESS
[FreeTextEntry1] : 23 y/o  LMP 4/10/24  presents for implant removal   10/20/2020 had [place] placed Wishes to have it removed and replaced with nexplanon

## 2024-04-17 NOTE — PHYSICAL EXAM
[03815] : A chaperone was present during the pelvic exam. [Appropriately responsive] : appropriately responsive [Alert] : alert [No Acute Distress] : no acute distress [Oriented x3] : oriented x3 [TextEntry] : L arm: Jadelle implant palpated in a V position

## 2024-04-17 NOTE — PROCEDURE
22-Feb-2017 12:15
[TextEntry] : The patient was counseled on the risks, benefits and alternatives of the subdermal implant. The patient was counseled that the implant goes under the skin of the arm, it is a thin, matchstick-sized guerrero made of plastic that releases the hormone progestin. This hormone like the hormone made by the body. It prevents pregnancy by preventing ovulation and thickening cervical mucous, this prevents sperm from getting to eggs. It is effective for 5 years. The benefits of the implant are: There nothing the patient has to do prior to sex to make the implant work. Return to fertility after implant removal is immediate. It helps with painful menses. The risks of the implant are: discoloring or a scar on the arm where the implant goes in. Rarely, arm pain for longer than a few days. Rarely, an infection or pain in the arm that needs medicine. Very rarely, an implant may move from the place where it was put in. The side effects of the implant are: nausea (which usually clears up in 2-3 months), sore breasts (usually clears up in 2-3 months), headache, irregular bleeding (including early or late periods, spotting in between menses, or no periods), weight gain, soreness, bruising, or swelling for a few days after the implant is put in.  The implant may affect other medications the patient is taking, and the patient was instructed to tell their physicians if medications are changed. No promise can be made about the outcome of putting in the implant. The patient voiced understanding of the risks of irregular bleeding and the need to use condoms for protection from STIs. They were counseled on the need to use back up contraception x 1 week  HcG: negative  The patient was counseled on the risks, benefits and alternatives of the subdermal implants.  The patient desires removal and re-implantation with new nexplanon.  They desire new nexplanon for contraception.  Pre-operative time out performed.  Patients name, date of birth and procedure confirmed.  The patients Left arm was prepped with Betadyne and 2 cc of 1% lidocaine was injected.  3mm stab incision made along distal edge of the V shape where the two implants meet using a scalpel and each implant was grasped with curved mosquito clamp.  Implants removed, measured and noted to be 4 cm long. The skin was entered with the tip of the needle slightly angled at less 30 degrees, and the needle was inserted until the bevel was just under the skin. The applicator was lowered to a nearly horizontal position and the skin was lifted with the needle while sliding the needle to its full length and tenting the skin upwards. The slider was moved back until it stopped and the implant inserted. The implant was palpated by the provider and the patient. Two sutures were placed for hemostasis. Excellent hemostasis noted. Steri-strip was applied with instructions to keep on x 72 hours. Pressure dressing was applied using sterile gauze, with instructions to keep on x 24 hours. The patient tolerated the procedure well. EBL: minimal  Patient given a User Card with instructions to follow up as needed and to have device removed in 5 years.  Lot#: [id number] Expiration: 11/2025  The patient tolerated the procedure well. EBL: minimal

## 2024-04-17 NOTE — PLAN
[FreeTextEntry1] : 25 y/o  LMP 4/10/24 presents for implant removal  Sendy removed Nexplanon removed Due to excessive manipulation Rx for bactrim sent

## 2024-05-21 ENCOUNTER — EMERGENCY (EMERGENCY)
Facility: HOSPITAL | Age: 24
LOS: 1 days | Discharge: HOME | End: 2024-05-21
Admitting: STUDENT IN AN ORGANIZED HEALTH CARE EDUCATION/TRAINING PROGRAM
Payer: COMMERCIAL

## 2024-05-21 VITALS
OXYGEN SATURATION: 99 % | DIASTOLIC BLOOD PRESSURE: 70 MMHG | TEMPERATURE: 98 F | HEART RATE: 89 BPM | SYSTOLIC BLOOD PRESSURE: 108 MMHG | RESPIRATION RATE: 18 BRPM

## 2024-05-21 PROCEDURE — 99284 EMERGENCY DEPT VISIT MOD MDM: CPT

## 2024-05-21 PROCEDURE — 71046 X-RAY EXAM CHEST 2 VIEWS: CPT | Mod: 26

## 2024-05-21 RX ORDER — IBUPROFEN 200 MG
600 TABLET ORAL ONCE
Refills: 0 | Status: COMPLETED | OUTPATIENT
Start: 2024-05-21 | End: 2024-05-21

## 2024-05-21 RX ADMIN — Medication 600 MILLIGRAM(S): at 17:09

## 2024-05-21 NOTE — ED PROVIDER NOTE - CONSTITUTIONAL APPEARANCE HYGIENE, MLM
Pt here with fever of 103.  Recent admission for fever and was covid negative at the time.  Nauseated and vomiting today, increased lethargy.  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   good hygiene

## 2024-05-21 NOTE — ED PROVIDER NOTE - NSFOLLOWUPINSTRUCTIONS_ED_ALL_ED_FT
Dolor musculoesquelético  Musculoskeletal Pain  "Dolor musculoesquelético" hace referencia a los mercedes y las molestias en los huesos, las articulaciones, los músculos y los tejidos que los rodean. Yisel dolor puede ocurrir en cualquier parte del cuerpo. Puede durar un breve período (mini) o prolongarse mucho tiempo (crónico).    Es posible que se realicen un examen físico, análisis de laboratorio y estudios de diagnóstico por imágenes para encontrar la causa del dolor musculoesquelético.    Siga estas instrucciones en ruth casa:  Estilo de trevor    Trate de controlar o reducir los niveles de estrés. El estrés aumenta la tensión muscular y puede empeorar el dolor musculoesquelético. Es importante reconocer cuando está ansioso o estresado y aprender distintas formas de controlar yisel estado. [place] puede incluir:  Yoga o meditación.  Terapia cognitiva o conductual.  Acupuntura o terapia de masajes.  Podrá seguir con todas las actividades, a menos que estas le generen más dolor. Cuando el dolor disminuya, retome las actividades habituales de a poco. Aumente gradualmente la intensidad y la duración de las actividades o del ejercicio que realice.  Control del dolor, la rigidez y la hinchazón        El tratamiento puede incluir medicamentos para el dolor y la inflamación que se cheryl por boca o que se aplican sobre la piel. Use los medicamentos de venta chacha y los recetados solamente yessi se lo haya indicado el médico.  Si el dolor es intenso, el reposo en cama puede ser beneficioso. Acuéstese o siéntese en cualquier posición que sea cómoda, marcus salga de la cama y camine al menos cada dos horas.  Si se lo indican, aplique calor en la duncan afectada con la frecuencia que le haya indicado el médico. Use la sabrina de calor que el médico le recomiende, yessi karen compresa de calor húmedo o karen almohadilla térmica.  Coloque karen toalla entre la piel y la sabrina de calor.  Aplique calor al 20 a 30 minutos.  Retire la sabrina de calor si la piel se pone de color singer brillante. [place] es especialmente importante si no puede sentir dolor, calor o frío. Puede correr un riesgo mayor de sufrir quemaduras.  Si se lo indican, aplique hielo sobre la duncan dolorida. Para hacer esto:  Ponga el hielo en karen bolsa plástica.  Coloque karen toalla entre la piel y la bolsa.  Aplique el hielo al 20 minutos, 2 o 3 veces por día.  Retire el hielo si la piel se pone de color singer brillante. [place] es muy importante. Si no puede sentir dolor, calor o frío, tiene un mayor riesgo de que se dañe la duncan.  Indicaciones generales    El médico puede recomendarle que consulte a un fisioterapeuta. Esta persona puede ayudarlo a elaborar un programa de ejercicios seguro.  Si se lo indica el médico, yohan ejercicios de fisioterapia para mejorar el movimiento y la fuerza de la duncan afectada.  Cumpla con todas las visitas de seguimiento. [place] es importante. [place] incluye las visitas al fisioterapeuta.  Comuníquese con un médico si:  El dolor empeora.  Los medicamentos no alivian el dolor.  No puede usar la parte del cuerpo que le duele, yessi un brazo, karen pierna o el gavino.  Tiene dificultad para dormir.  Tiene dificultad para realizar las actividades cotidianas.  Solicite ayuda de inmediato si:  Tiene karen nueva lesión o el dolor empeora o es diferente.  Tiene adormecimiento u hormigueo en la duncan dolorida.  Resumen  "Dolor musculoesquelético" hace referencia a los mercedes y las molestias en los huesos, las articulaciones, los músculos y los tejidos que los rodean.  Yisel dolor puede ocurrir en cualquier parte del cuerpo.  El médico puede recomendarle que consulte a un fisioterapeuta. Esta persona puede ayudarlo a elaborar un programa de ejercicios seguro. Yohan ejercicios yessi se lo haya indicado el fisioterapeuta.  Disminuya los niveles de estrés. El estrés puede empeorar el dolor musculoesquelético. Entre los métodos para disminuir el estrés se pueden mencionar la meditación, el yoga, la terapia cognitiva o conductual, la acupuntura y la terapia de masajes.  Esta información no tiene yessi fin reemplazar el consejo del médico. Asegúrese de hacerle al médico cualquier pregunta que tenga.

## 2024-05-21 NOTE — ED PROVIDER NOTE - PATIENT PORTAL LINK FT
You can access the FollowMyHealth Patient Portal offered by [facility] by registering at the following website: [email or web address]. By joining [facility]’s FollowMyHealth portal, you will also be able to view your health information using other applications (apps) compatible with our system.

## 2024-05-21 NOTE — ED ADULT NURSE NOTE - NSFALLUNIVINTERV_ED_ALL_ED
Bed/Stretcher in lowest position, wheels locked, appropriate side rails in place/Call bell, personal items and telephone in reach/Instruct patient to call for assistance before getting out of bed/chair/stretcher/Non-slip footwear applied when patient is off stretcher/[place] to call system/Physically safe environment - no spills, clutter or unnecessary equipment/Purposeful proactive rounding/Room/bathroom lighting operational, light cord in reach
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
## 2024-10-28 ENCOUNTER — APPOINTMENT (OUTPATIENT)
Dept: INTERNAL MEDICINE | Facility: CLINIC | Age: 24
End: 2024-10-28
Payer: COMMERCIAL

## 2024-10-28 ENCOUNTER — OUTPATIENT (OUTPATIENT)
Dept: OUTPATIENT SERVICES | Facility: HOSPITAL | Age: 24
LOS: 1 days | End: 2024-10-28

## 2024-10-28 VITALS
OXYGEN SATURATION: 99 % | HEIGHT: 64.96 IN | BODY MASS INDEX: 19.16 KG/M2 | DIASTOLIC BLOOD PRESSURE: 60 MMHG | WEIGHT: 115 LBS | SYSTOLIC BLOOD PRESSURE: 90 MMHG | HEART RATE: 72 BPM

## 2024-10-28 DIAGNOSIS — Z00.00 ENCOUNTER FOR GENERAL ADULT MEDICAL EXAMINATION W/OUT ABNORMAL FINDINGS: ICD-10-CM

## 2024-10-28 DIAGNOSIS — Z01.419 ENCOUNTER FOR GYNECOLOGICAL EXAMINATION (GENERAL) (ROUTINE) W/OUT ABNORMAL FINDINGS: ICD-10-CM

## 2024-10-28 DIAGNOSIS — Z23 ENCOUNTER FOR IMMUNIZATION: ICD-10-CM

## 2024-10-28 DIAGNOSIS — R10.2 PELVIC AND PERINEAL PAIN: ICD-10-CM

## 2024-10-28 DIAGNOSIS — Z11.3 ENCOUNTER FOR SCREENING FOR INFECTIONS WITH A PREDOMINANTLY SEXUAL MODE OF TRANSMISSION: ICD-10-CM

## 2024-10-28 DIAGNOSIS — Z78.9 OTHER SPECIFIED HEALTH STATUS: ICD-10-CM

## 2024-10-28 DIAGNOSIS — N89.8 OTHER SPECIFIED NONINFLAMMATORY DISORDERS OF VAGINA: ICD-10-CM

## 2024-10-28 DIAGNOSIS — H53.8 OTHER VISUAL DISTURBANCES: ICD-10-CM

## 2024-10-28 DIAGNOSIS — N76.0 ACUTE VAGINITIS: ICD-10-CM

## 2024-10-28 DIAGNOSIS — B96.89 ACUTE VAGINITIS: ICD-10-CM

## 2024-10-28 DIAGNOSIS — R51.9 HEADACHE, UNSPECIFIED: ICD-10-CM

## 2024-10-28 DIAGNOSIS — M25.50 PAIN IN UNSPECIFIED JOINT: ICD-10-CM

## 2024-10-28 LAB
APPEARANCE: CLEAR
BILIRUBIN URINE: NEGATIVE
BLOOD URINE: NEGATIVE
COLOR: YELLOW
GLUCOSE QUALITATIVE U: NEGATIVE MG/DL
HCT VFR BLD CALC: 43.1 %
HGB BLD-MCNC: 14.3 G/DL
KETONES URINE: NEGATIVE MG/DL
LEUKOCYTE ESTERASE URINE: NEGATIVE
MCHC RBC-ENTMCNC: 28.4 PG
MCHC RBC-ENTMCNC: 33.2 GM/DL
MCV RBC AUTO: 85.7 FL
NITRITE URINE: NEGATIVE
PH URINE: 6.5
PLATELET # BLD AUTO: 352 K/UL
PROTEIN URINE: NEGATIVE MG/DL
RBC # BLD: 5.03 M/UL
RBC # FLD: 12.6 %
SPECIFIC GRAVITY URINE: 1.03
UROBILINOGEN URINE: 0.2 MG/DL
WBC # FLD AUTO: 8.73 K/UL

## 2024-10-28 PROCEDURE — 99395 PREV VISIT EST AGE 18-39: CPT

## 2024-10-29 PROBLEM — Z01.419 WOMEN'S ANNUAL ROUTINE GYNECOLOGICAL EXAMINATION: Status: RESOLVED | Noted: 2023-07-12 | Resolved: 2024-10-29

## 2024-10-29 PROBLEM — H53.8 BLURRY VISION: Status: ACTIVE | Noted: 2024-10-28

## 2024-10-29 PROBLEM — Z23 ENCOUNTER FOR IMMUNIZATION: Status: RESOLVED | Noted: 2021-09-16 | Resolved: 2024-10-29

## 2024-10-29 PROBLEM — R51.9 HEADACHE: Status: ACTIVE | Noted: 2024-10-28

## 2024-10-29 PROBLEM — N76.0 BACTERIAL VAGINOSIS: Status: RESOLVED | Noted: 2021-09-18 | Resolved: 2024-10-29

## 2024-10-29 PROBLEM — N89.8 DISCHARGE FROM THE VAGINA: Status: ACTIVE | Noted: 2024-10-28

## 2024-10-29 PROBLEM — M25.50 JOINT PAIN: Status: ACTIVE | Noted: 2024-10-28

## 2024-10-30 LAB
ALBUMIN SERPL ELPH-MCNC: 4.6 G/DL
ALP BLD-CCNC: 121 U/L
ALT SERPL-CCNC: 23 U/L
ANION GAP SERPL CALC-SCNC: 14 MMOL/L
AST SERPL-CCNC: 18 U/L
BILIRUB SERPL-MCNC: 0.3 MG/DL
BUN SERPL-MCNC: 13 MG/DL
CALCIUM SERPL-MCNC: 9.6 MG/DL
CHLORIDE SERPL-SCNC: 103 MMOL/L
CO2 SERPL-SCNC: 23 MMOL/L
CREAT SERPL-MCNC: 0.7 MG/DL
EGFR: 125 ML/MIN/1.73M2
ESTIMATED AVERAGE GLUCOSE: 108 MG/DL
GLUCOSE SERPL-MCNC: 99 MG/DL
HBA1C MFR BLD HPLC: 5.4 %
N GONORRHOEA RRNA SPEC QL NAA+PROBE: NOT DETECTED
POTASSIUM SERPL-SCNC: 4.7 MMOL/L
PROT SERPL-MCNC: 7.7 G/DL
SODIUM SERPL-SCNC: 140 MMOL/L
SOURCE AMPLIFICATION: NORMAL

## 2024-10-31 DIAGNOSIS — R10.2 PELVIC AND PERINEAL PAIN: ICD-10-CM

## 2024-10-31 DIAGNOSIS — R51.9 HEADACHE, UNSPECIFIED: ICD-10-CM

## 2024-10-31 DIAGNOSIS — N89.8 OTHER SPECIFIED NONINFLAMMATORY DISORDERS OF VAGINA: ICD-10-CM

## 2024-10-31 DIAGNOSIS — M25.50 PAIN IN UNSPECIFIED JOINT: ICD-10-CM

## 2024-10-31 DIAGNOSIS — Z00.00 ENCOUNTER FOR GENERAL ADULT MEDICAL EXAMINATION WITHOUT ABNORMAL FINDINGS: ICD-10-CM

## 2024-10-31 DIAGNOSIS — H53.8 OTHER VISUAL DISTURBANCES: ICD-10-CM

## 2024-11-11 ENCOUNTER — APPOINTMENT (OUTPATIENT)
Dept: INTERNAL MEDICINE | Facility: CLINIC | Age: 24
End: 2024-11-11
Payer: COMMERCIAL

## 2024-11-11 ENCOUNTER — OUTPATIENT (OUTPATIENT)
Dept: OUTPATIENT SERVICES | Facility: HOSPITAL | Age: 24
LOS: 1 days | End: 2024-11-11

## 2024-11-11 VITALS
BODY MASS INDEX: 19.33 KG/M2 | DIASTOLIC BLOOD PRESSURE: 52 MMHG | HEART RATE: 65 BPM | SYSTOLIC BLOOD PRESSURE: 90 MMHG | RESPIRATION RATE: 16 BRPM | HEIGHT: 64.96 IN | WEIGHT: 116 LBS | OXYGEN SATURATION: 98 %

## 2024-11-11 DIAGNOSIS — F32.A DEPRESSION, UNSPECIFIED: ICD-10-CM

## 2024-11-11 DIAGNOSIS — R10.2 PELVIC AND PERINEAL PAIN: ICD-10-CM

## 2024-11-11 DIAGNOSIS — R51.9 HEADACHE, UNSPECIFIED: ICD-10-CM

## 2024-11-11 PROCEDURE — G0444 DEPRESSION SCREEN ANNUAL: CPT | Mod: 59

## 2024-11-11 PROCEDURE — G2211 COMPLEX E/M VISIT ADD ON: CPT | Mod: NC

## 2024-11-11 PROCEDURE — 99213 OFFICE O/P EST LOW 20 MIN: CPT

## 2024-11-15 DIAGNOSIS — R10.2 PELVIC AND PERINEAL PAIN: ICD-10-CM

## 2024-11-15 DIAGNOSIS — F32.A DEPRESSION, UNSPECIFIED: ICD-10-CM

## 2024-11-15 DIAGNOSIS — R51.9 HEADACHE, UNSPECIFIED: ICD-10-CM

## 2025-04-03 ENCOUNTER — APPOINTMENT (OUTPATIENT)
Dept: INTERNAL MEDICINE | Facility: CLINIC | Age: 25
End: 2025-04-03